# Patient Record
Sex: FEMALE | Race: BLACK OR AFRICAN AMERICAN | NOT HISPANIC OR LATINO | Employment: FULL TIME | URBAN - METROPOLITAN AREA
[De-identification: names, ages, dates, MRNs, and addresses within clinical notes are randomized per-mention and may not be internally consistent; named-entity substitution may affect disease eponyms.]

---

## 2017-01-11 ENCOUNTER — PRENATAL OFFICE VISIT (OUTPATIENT)
Dept: OBGYN | Facility: CLINIC | Age: 22
End: 2017-01-11
Payer: COMMERCIAL

## 2017-01-11 VITALS
WEIGHT: 195 LBS | DIASTOLIC BLOOD PRESSURE: 72 MMHG | BODY MASS INDEX: 31.34 KG/M2 | SYSTOLIC BLOOD PRESSURE: 104 MMHG | HEIGHT: 66 IN

## 2017-01-11 DIAGNOSIS — J45.20 MILD INTERMITTENT ASTHMA WITHOUT COMPLICATION: ICD-10-CM

## 2017-01-11 DIAGNOSIS — K59.09 CHRONIC CONSTIPATION: ICD-10-CM

## 2017-01-11 DIAGNOSIS — Z34.02 ENCOUNTER FOR SUPERVISION OF NORMAL FIRST PREGNANCY IN SECOND TRIMESTER: Primary | ICD-10-CM

## 2017-01-11 LAB
ABO + RH BLD: NORMAL
ABO + RH BLD: NORMAL
BLD GP AB SCN SERPL QL: NORMAL
BLOOD BANK CMNT PATIENT-IMP: NORMAL
ERYTHROCYTE [DISTWIDTH] IN BLOOD BY AUTOMATED COUNT: 13.3 % (ref 10–15)
HCT VFR BLD AUTO: 37.4 % (ref 35–47)
HGB BLD-MCNC: 12.1 G/DL (ref 11.7–15.7)
MCH RBC QN AUTO: 26.2 PG (ref 26.5–33)
MCHC RBC AUTO-ENTMCNC: 32.4 G/DL (ref 31.5–36.5)
MCV RBC AUTO: 81 FL (ref 78–100)
PLATELET # BLD AUTO: 300 10E9/L (ref 150–450)
RBC # BLD AUTO: 4.61 10E12/L (ref 3.8–5.2)
SPECIMEN EXP DATE BLD: NORMAL
WBC # BLD AUTO: 9.4 10E9/L (ref 4–11)

## 2017-01-11 PROCEDURE — 85027 COMPLETE CBC AUTOMATED: CPT | Performed by: ADVANCED PRACTICE MIDWIFE

## 2017-01-11 PROCEDURE — 87389 HIV-1 AG W/HIV-1&-2 AB AG IA: CPT | Performed by: ADVANCED PRACTICE MIDWIFE

## 2017-01-11 PROCEDURE — 83021 HEMOGLOBIN CHROMOTOGRAPHY: CPT | Mod: 90 | Performed by: ADVANCED PRACTICE MIDWIFE

## 2017-01-11 PROCEDURE — 87491 CHLMYD TRACH DNA AMP PROBE: CPT | Performed by: ADVANCED PRACTICE MIDWIFE

## 2017-01-11 PROCEDURE — 87591 N.GONORRHOEAE DNA AMP PROB: CPT | Performed by: ADVANCED PRACTICE MIDWIFE

## 2017-01-11 PROCEDURE — 86900 BLOOD TYPING SEROLOGIC ABO: CPT | Performed by: ADVANCED PRACTICE MIDWIFE

## 2017-01-11 PROCEDURE — 99207 ZZC FIRST OB VISIT: CPT | Performed by: ADVANCED PRACTICE MIDWIFE

## 2017-01-11 PROCEDURE — 87340 HEPATITIS B SURFACE AG IA: CPT | Performed by: ADVANCED PRACTICE MIDWIFE

## 2017-01-11 PROCEDURE — 87086 URINE CULTURE/COLONY COUNT: CPT | Performed by: ADVANCED PRACTICE MIDWIFE

## 2017-01-11 PROCEDURE — 86780 TREPONEMA PALLIDUM: CPT | Performed by: ADVANCED PRACTICE MIDWIFE

## 2017-01-11 PROCEDURE — 86901 BLOOD TYPING SEROLOGIC RH(D): CPT | Performed by: ADVANCED PRACTICE MIDWIFE

## 2017-01-11 PROCEDURE — 81511 FTL CGEN ABNOR FOUR ANAL: CPT | Mod: 90 | Performed by: ADVANCED PRACTICE MIDWIFE

## 2017-01-11 PROCEDURE — 99000 SPECIMEN HANDLING OFFICE-LAB: CPT | Performed by: ADVANCED PRACTICE MIDWIFE

## 2017-01-11 PROCEDURE — 36415 COLL VENOUS BLD VENIPUNCTURE: CPT | Performed by: ADVANCED PRACTICE MIDWIFE

## 2017-01-11 PROCEDURE — 86850 RBC ANTIBODY SCREEN: CPT | Performed by: ADVANCED PRACTICE MIDWIFE

## 2017-01-11 PROCEDURE — 86762 RUBELLA ANTIBODY: CPT | Performed by: ADVANCED PRACTICE MIDWIFE

## 2017-01-11 RX ORDER — PRENATAL VIT/IRON FUM/FOLIC AC 27MG-0.8MG
1 TABLET ORAL DAILY
Qty: 100 TABLET | Refills: 3 | COMMUNITY
Start: 2017-01-11

## 2017-01-11 RX ORDER — ASPIRIN 81 MG
100 TABLET, DELAYED RELEASE (ENTERIC COATED) ORAL DAILY
Qty: 60 TABLET | Refills: 3 | Status: SHIPPED | OUTPATIENT
Start: 2017-01-11 | End: 2017-06-07

## 2017-01-11 RX ORDER — ALBUTEROL SULFATE 90 UG/1
2 AEROSOL, METERED RESPIRATORY (INHALATION) EVERY 6 HOURS PRN
Qty: 3 INHALER | Refills: 1 | COMMUNITY
Start: 2017-01-11

## 2017-01-11 NOTE — PROGRESS NOTES
15w0d  NOB here at [facility].   Works as  in .  FOB not involved, pt family very supportive.  Reviewed CNM service, warning signs. Strongly encouraged 10-15 lb weight gain only as family hx of diabetes and hypertension.  Reviewed carbohydrate portion sizes, enc. 6 fruits/vegs daily and 8 glasses of water.  Pt has hx of constipation, discussed constipation remedies dietary but will prescribe stool softeners to use every other day.  rtc in 4 weeks with U/S for fetal survey. luis alberto Gunter is a  single   1 para 0 0 0 0  with a LMP of of 9/10/16 giving an EDC by ultrasound of 19 who presents for a new OB Visit.  The first positive pregnancy test was obtained on 16.  Conception date is unknown.  She has not had bleeding since her LMP.  She has had mild nausea.. Weigh loss   has not occurred.. She denies fever, chills and viral infections since her last LMP.  She stopped taking  (medications) when she suspected pregnancy.  There is mildfatigue.  This was not a planned pregnancy.  She is not a previous CNM patient.  FOB is not involved.  =========================================    INFECTION HISTORY  HIV: no  Hepatitis B: no  Hepatitis C: no  Tuberculosis: no  Last PPD   Herpes self: no  Herpes partner:  no  Chlamydia:  no  Gonorrhea:  no  HPV: no  BV:  no  Trichomonis:  no  Syphilis:  no  Chicken Pox:  no  ============================================    PERSONAL/SOCIAL HISTORY  Lives lives with their family.  Employment: Full time.  Her job involves moderate activity and long periods of standing with little potential for toxic exposure.  Additional items: None  =============================================    REVIEW OF SYSTEMS  CONSTITUTIONAL: Denies fever, chills and night sweats  DIET: Appetite is decrease.  Eats a regular.  Caffeine intake daily is 1-2.    Plans to gain 10-15 pounds with her pregnancy.  SKIN: Denies changes and suspicious moles or lesions.  ENT:  Denies blurred vision, hearing loss, tinnitus, frequent colds, epistaxis, hoarseness and tooth pain.    ENDOCRINE: Denies heat and cold intolerance, and polydypsia.    BREASTS:  Intermittent tenderness since LMP.  Denies discharge and lumps.   HEART/LUNGS: Denies dyspnea, wheezing, coughing and chest pain.  HEMATOLOGIC: Denies tendency to bruise and history of blood clots.  GASTROINTESTINAL: Denies heartburn, indigestion and change of color in stools.    GENITOURINARY:  Denies urgency, dysuria and hematuria.  Has frequency of urination since LMP.   NEUROLOGIC:  Denies seizures, weakness and fainting.  PSYCHIATRIC:  Denies depression or anxiety  ===========================================    PHYSICAL EXAM  GENERAL:   pleasant pregnant female, alert, cooperative  and well groomed.  SKIN:  Warm and dry, without lesions or tenderness.    HEAD: Symmetrical features.  EYES:  PERRLA, wears glasses.  EARS: Tympanic membranes gray, translucent and intact.  NOSE: No flaring, patent  MOUTH:  Buccal mucosa pink, moist without lesions.  Teeth in good repair.    NECK:  Thyroid without enlargement and nodules.  Lymph nodes not palpable.   LUNGS:  Clear to auscultation.  BREAST:  Symmetrical without lesions or nodes.  Nipples everted.  Areolas symmetric.  No palpable axillary nodes.  HEART:  RRR without murmur.  ABDOMEN: Soft without masses or tenderness.  No CVA tenderness.  Uterus palpable at size equal to dates.    .Well healed scar from appendectomy.  MUSCULOSKELETAL:  Full range of motion  GENITALIA: Secondary genital hair growth pattern is in a normal female distribution.  Bartholin and Blanche glands without tenderness and inflammation.  Perineum without lesions.  VAGINA:  Pink, normal ruga and discharge.  CERVIX:  Anterior, smooth, without discharge or CMT and nulliparous os, firm/ closed 4 cm long.  UTERUS: Anteverted, nontender 15 weeks in size.  ADNEXA: Without masses or tenderness  RECTAL:  Normal appearance.  Digital  exam deferred.  PELVIS:  Arch; wide . Sacrum; deep. Spines;blunt.  Side walls; straight. Type; gynecoid  Pelvis not proven  EXTREMITIES:  2+/2+ DTR, No edema. No significant varicosities.  UA;  Negative- glucose, bilirubin, ketones, blood, protein, nitrites, leukocytes, normal urobili, pH7.0, SG; 1.010  ===================================================    PLAN:  Instructed on use of triage nurse line and contacting the on call CNM after hours in an emergency.    Discussed the indications, uses for and false positives for quad screen and fetal survey ultrasounds at 18-20 weeks gestation.  Will inform us at the next visit if she wished to avail herself of these screens.  Will return to the clinic in 4 weeks for her next routine prenatal check.  Will call to be seen sooner if problem arise.  Discussed the risks, benefits, side effects and alternative therapies for current prescribed and OTC medications.

## 2017-01-11 NOTE — Clinical Note
[address] [id number] [phone]        January 18, 2017      Julieth Connelly  [address]   [facility] [address]          Dear Ms. Connelly,    The results of your recent lab tests were within normal limits. Your QUAD screen was also normal. Enclosed is a copy of these results.  If you have any further questions or problems, please contact our office at [phone].  Component      Latest Ref Rng 1/11/2017          10:00 AM   Hemoglobin A1          Hemoglobin A2          Hemoglobin F          Hemoglobin S Eval          Hemoglobin C          Hemoglobin E          Hemoglobin Other          HGB Abn Evaluation          Sickle Cell Solubility Confirm          Hemoglobin Capillary ELP          WBC      4.0 - 11.0 10e9/L    RBC Count      3.8 - 5.2 10e12/L    Hemoglobin      11.7 - 15.7 g/dL    Hematocrit      35.0 - 47.0 %    MCV      78 - 100 fl    MCH      26.5 - 33.0 pg    MCHC      31.5 - 36.5 g/dL    RDW      10.0 - 15.0 %    Platelet Count      150 - 450 10e9/L    ABO          RH(D)          Antibody Screen          Test Valid Only At          Specimen Expires          Specimen Description       Midstream Urine   Special Requests       Specimen received in preservative   Culture Micro       No growth   Micro Report Status       FINAL 01/12/2017   Specimen Descrip          N Gonorrhea PCR      NEG    Chlamydia Trachomatis PCR      NEG    HIV Antigen Antibody Combo      NR    Rubella Antibody IgG Quantitative          Hep B Surface Agn      NR    Treponema pallidum Antibody      NEG      Component      Latest Ref Rng 1/11/2017          10:30 AM   Hemoglobin A1       97.0   Hemoglobin A2       2.8   Hemoglobin F       0.2   Hemoglobin S Eval       0.0   Hemoglobin C       0.0   Hemoglobin E       0.0   Hemoglobin Other       0.0   HGB Abn Evaluation       SEE NOTE . . .   Sickle Cell Solubility Confirm       Not Performed   Hemoglobin  Capillary ELP       Not Performed . . .   WBC      4.0 - 11.0 10e9/L 9.4   RBC Count      3.8 - 5.2 10e12/L 4.61   Hemoglobin      11.7 - 15.7 g/dL 12.1   Hematocrit      35.0 - 47.0 % 37.4   MCV      78 - 100 fl 81   MCH      26.5 - 33.0 pg 26.2 (L)   MCHC      31.5 - 36.5 g/dL 32.4   RDW      10.0 - 15.0 % 13.3   Platelet Count      150 - 450 10e9/L 300   ABO       O   RH(D)       Pos   Antibody Screen       Neg   Test Valid Only At       [facility]   Specimen Expires       01/14/2017   Specimen Description       Cervix   Special Requests          Culture Micro          Micro Report Status          Specimen Descrip       Cervix   N Gonorrhea PCR      NEG Negative . . .   Chlamydia Trachomatis PCR      NEG Negative . . .   HIV Antigen Antibody Combo      NR Nonreactive . . .   Rubella Antibody IgG Quantitative       18   Hep B Surface Agn      NR Nonreactive   Treponema pallidum Antibody      NEG Negative     Sincerely,        Bernie Dickerson CNM/ty

## 2017-01-12 LAB
BACTERIA SPEC CULT: NO GROWTH
C TRACH DNA SPEC QL NAA+PROBE: NORMAL
HBV SURFACE AG SERPL QL IA: NONREACTIVE
HIV 1+2 AB+HIV1 P24 AG SERPL QL IA: NORMAL
Lab: NORMAL
MICRO REPORT STATUS: NORMAL
N GONORRHOEA DNA SPEC QL NAA+PROBE: NORMAL
RUBV IGG SERPL IA-ACNC: 18 IU/ML
SPECIMEN SOURCE: NORMAL
T PALLIDUM IGG+IGM SER QL: NEGATIVE

## 2017-01-12 ASSESSMENT — PATIENT HEALTH QUESTIONNAIRE - PHQ9: SUM OF ALL RESPONSES TO PHQ QUESTIONS 1-9: 8

## 2017-01-13 LAB
# FETUSES US: NORMAL
AFP ADJ MOM AMN: 1
AFP SERPL-MCNC: 34 NG/ML
AGE - REPORTED: 20.2
DATING METHOD: NORMAL
DIABETIC AT CONCEPTION: NO
FAMILY MEMBER DISEASES HX: NO
FAMILY MEMBER DISEASES HX: NO
GA METHOD: NORMAL
GA: 16.86 WK
HCG MOM SERPL: 1.87
HCG SERPL-ACNC: NORMAL M[IU]/ML
HGB A1 MFR BLD: 97 %
HGB A2 MFR BLD: 2.8 %
HGB C MFR BLD: 0 %
HGB E MFR BLD: 0 %
HGB F MFR BLD: 0.2 %
HGB FRACT BLD ELPH-IMP: NORMAL
HGB OTHER MFR BLD: 0 %
HGB S BLD QL SOLY: NORMAL
HGB S MFR BLD: 0 %
HX OF HEREDITARY DISORDERS: NO
IDDM PATIENT QL: NO
INHIBIN A MOM SERPL: 1.32
INHIBIN A SERPL-MCNC: 175
INTEGRATED SCN PATIENT-IMP: NORMAL
LMP START DATE: NORMAL
PATH INTERP BLD-IMP: NORMAL
PATHOLOGY STUDY: NORMAL
PREV HX CHROMOSOME ABNORMALITY: NO
SPECIMEN DRAWN SERPL: NORMAL
TWINS: NO
U ESTRIOL MOM SERPL: 0.58
U ESTRIOL SERPL-MCNC: 0.59 NG/ML

## 2017-01-28 ENCOUNTER — TELEPHONE (OUTPATIENT)
Dept: OBGYN | Facility: CLINIC | Age: 22
End: 2017-01-28

## 2017-01-28 ENCOUNTER — TELEPHONE (OUTPATIENT)
Dept: NURSING | Facility: CLINIC | Age: 22
End: 2017-01-28

## 2017-01-29 NOTE — TELEPHONE ENCOUNTER
Call Type: Triage Call    Presenting Problem: 17 wks pregnant chills cough headache tylenol  taken didn't help haven't been eating dont have appetite feels super  full when tries. symptoms started this am.  no thermometer.  [phone]. Paged oncall midwife to pt at [phone] via smart web.  Triage Note:  Guideline Title: Upper Respiratory Infection (URI) ; Upper Respiratory  Infection (URI)  Recommended Disposition: Provide Home/Self Care  Original Inclination: Wanted to speak with a nurse  Override Disposition: Call Provider Immediately  Intended Action: See Dr/Make Appt  Physician Contacted: No  All other situations ?  YES  Hoarseness (laryngitis) ? NO  Ear congestion, pressure, fullness ? NO  Severe breathing problems ? NO  Breathing symptoms (wheezing, shortness of breath, changes in rates of breathing,  skin color changes) main problem ? NO  Sore throat is the symptom causing most concern ? NO  Cough is the symptom causing most concern ? NO  New onset of eye redness, irritation/foreign body sensation or gritty feeling with  yellow/green drainage ? NO  Temperature of 101.5 F (38.6 C) or greater that has not responded to 24 hours of  home care measures ? NO  Localized tender, swollen glands (lymph nodes) that persist or are unimproved  after 14 days ? NO  Symptoms worsen after 7 days or symptoms do not improve after 14 days of home care  ? NO  Recent or recurrent episodes of sneezing, nasal congestion; watery nasal drainage;  scratchy/itchy throat; red/itchy/watery eyes or cough unrelieved after one week  of home care measures ? NO  Dry cough following a cold ? NO  Sudden onset of flu-like symptoms ? NO  Temperature up to 101.5 F (38.6C) and has not started any home care OR home care  for less than 24 hours ? NO  Pregnant and has a temperature up to 100 F (37.7 C) that has not responded to 3  days of home care ? Asked but Not Answered:  New onset of the following symptoms: nasal congestion;  runny nose;  sneezing;  itchy or mild sore throat. May also have cough; irritated eyes or a mild headache  or a low grade fever up to 101.5 F (38.6C). ? NO  Being treated by a provider for a secondary infection AND no improvement in  symptoms, symptoms have worsened OR has new symptoms after following treatment  plan for the time specified by provider. ? NO  Previous call within last week for similar symptoms AND has followed recommended  self care measures for a week but symptoms have not improved or symptoms have  worsened. ? NO  Has called within last week and has questions/concerns about symptoms, recommended  treatment or self care. ? NO  Mild to moderate headache for more than 24 hours unrelieved with 8 hours of  nonprescription medications ? NO  Generalized mild frontal headache unresponsive to 24 hours of home care measures ?  NO  Severe headache unrelieved by 8 hours of nonprescription medication ? NO  New productive cough with thick colored sputum (other than clear or white sputum;  not postnasal drainage) ? NO  Pregnant and has temperature 100F (37.7 C) or greater ? Asked but Not Answered:  Any temperature elevation in an immunocompromised individual OR frail elderly with  signs of dehydration ? NO  Pressure/pain above or below eyes, near ears over sinuses (may also be described  as fullness, worsens when bending forward, teeth or eye pain) ? NO  Physician Instructions:  Care Advice: SYMPTOM / CONDITION MANAGEMENT

## 2017-01-29 NOTE — TELEPHONE ENCOUNTER
Called feeling miserable with cold symptoms.  Sounds very congested on the phone.  Says she has a cough and headache.  She thinks her mom has strep throat.  I advised fluids and rest.  She can take Tylenol and plain cough medicine and sudafed or benadryl.  She can go to Urgent Care to tomorrow to get checked for strep.  She verbalized agreement and understand of plan.

## 2017-02-09 ENCOUNTER — RADIANT APPOINTMENT (OUTPATIENT)
Dept: ULTRASOUND IMAGING | Facility: CLINIC | Age: 22
End: 2017-02-09
Attending: ADVANCED PRACTICE MIDWIFE
Payer: COMMERCIAL

## 2017-02-09 ENCOUNTER — PRENATAL OFFICE VISIT (OUTPATIENT)
Dept: MIDWIFE SERVICES | Facility: CLINIC | Age: 22
End: 2017-02-09
Attending: ADVANCED PRACTICE MIDWIFE
Payer: COMMERCIAL

## 2017-02-09 VITALS
BODY MASS INDEX: 33.58 KG/M2 | WEIGHT: 205 LBS | SYSTOLIC BLOOD PRESSURE: 105 MMHG | HEART RATE: 86 BPM | DIASTOLIC BLOOD PRESSURE: 69 MMHG | OXYGEN SATURATION: 100 %

## 2017-02-09 DIAGNOSIS — Z34.02 ENCOUNTER FOR SUPERVISION OF NORMAL FIRST PREGNANCY IN SECOND TRIMESTER: ICD-10-CM

## 2017-02-09 DIAGNOSIS — Z34.02 ENCOUNTER FOR SUPERVISION OF NORMAL FIRST PREGNANCY IN SECOND TRIMESTER: Primary | ICD-10-CM

## 2017-02-09 PROCEDURE — 76805 OB US >/= 14 WKS SNGL FETUS: CPT | Performed by: OBSTETRICS & GYNECOLOGY

## 2017-02-09 PROCEDURE — 99207 ZZC PRENATAL VISIT: CPT | Performed by: ADVANCED PRACTICE MIDWIFE

## 2017-02-09 NOTE — PROGRESS NOTES
19w1d  Fetal survey ultrasound today. Prelim results are are within normal limits. Has a gender reveal party on Saturday. Reports good fetal movement. Denies leaking of fluid, vaginal bleeding, regular uterine contractions, headache or other concerns.  Discussed upcoming GCT but has appointment scheduled for <24 wks. Will need to schedule another sometime between 24-26 wks. [facility]

## 2017-02-20 ENCOUNTER — PRENATAL OFFICE VISIT (OUTPATIENT)
Dept: MIDWIFE SERVICES | Facility: CLINIC | Age: 22
End: 2017-02-20
Payer: COMMERCIAL

## 2017-02-20 VITALS
TEMPERATURE: 97.8 F | HEART RATE: 94 BPM | WEIGHT: 209 LBS | DIASTOLIC BLOOD PRESSURE: 69 MMHG | BODY MASS INDEX: 34.25 KG/M2 | OXYGEN SATURATION: 100 % | SYSTOLIC BLOOD PRESSURE: 113 MMHG

## 2017-02-20 DIAGNOSIS — N76.0 BV (BACTERIAL VAGINOSIS): ICD-10-CM

## 2017-02-20 DIAGNOSIS — R30.0 DYSURIA: Primary | ICD-10-CM

## 2017-02-20 DIAGNOSIS — B96.89 BV (BACTERIAL VAGINOSIS): ICD-10-CM

## 2017-02-20 DIAGNOSIS — N89.8 VAGINAL ITCHING: ICD-10-CM

## 2017-02-20 DIAGNOSIS — R82.90 NONSPECIFIC FINDING ON EXAMINATION OF URINE: ICD-10-CM

## 2017-02-20 LAB
ALBUMIN UR-MCNC: NEGATIVE MG/DL
APPEARANCE UR: CLEAR
BACTERIA #/AREA URNS HPF: ABNORMAL /HPF
BILIRUB UR QL STRIP: NEGATIVE
COLOR UR AUTO: YELLOW
GLUCOSE UR STRIP-MCNC: NEGATIVE MG/DL
HGB UR QL STRIP: NEGATIVE
KETONES UR STRIP-MCNC: NEGATIVE MG/DL
LEUKOCYTE ESTERASE UR QL STRIP: ABNORMAL
MICRO REPORT STATUS: ABNORMAL
NITRATE UR QL: NEGATIVE
NON-SQ EPI CELLS #/AREA URNS LPF: ABNORMAL /LPF
PH UR STRIP: 6 PH (ref 5–7)
RBC #/AREA URNS AUTO: ABNORMAL /HPF (ref 0–2)
SP GR UR STRIP: 1.02 (ref 1–1.03)
SPECIMEN SOURCE: ABNORMAL
URN SPEC COLLECT METH UR: ABNORMAL
UROBILINOGEN UR STRIP-ACNC: 1 EU/DL (ref 0.2–1)
WBC #/AREA URNS AUTO: ABNORMAL /HPF (ref 0–2)
WET PREP SPEC: ABNORMAL

## 2017-02-20 PROCEDURE — 87210 SMEAR WET MOUNT SALINE/INK: CPT | Performed by: ADVANCED PRACTICE MIDWIFE

## 2017-02-20 PROCEDURE — 99207 ZZC PRENATAL VISIT: CPT | Performed by: ADVANCED PRACTICE MIDWIFE

## 2017-02-20 PROCEDURE — 81001 URINALYSIS AUTO W/SCOPE: CPT | Performed by: ADVANCED PRACTICE MIDWIFE

## 2017-02-20 PROCEDURE — 87086 URINE CULTURE/COLONY COUNT: CPT | Performed by: ADVANCED PRACTICE MIDWIFE

## 2017-02-20 RX ORDER — TERCONAZOLE 0.4 %
1 CREAM WITH APPLICATOR VAGINAL AT BEDTIME
Qty: 45 G | Refills: 0 | Status: SHIPPED | OUTPATIENT
Start: 2017-02-20 | End: 2017-02-27

## 2017-02-20 RX ORDER — METRONIDAZOLE 500 MG/1
500 TABLET ORAL 2 TIMES DAILY
Qty: 14 TABLET | Refills: 0 | Status: SHIPPED | OUTPATIENT
Start: 2017-02-20 | End: 2017-03-08

## 2017-02-20 NOTE — MR AVS SNAPSHOT
After Visit Summary   2/20/2017    Julieth Connelly    MRN: [id number]           Patient Information     Date Of Birth          1995        Visit Information        Provider Department      2/20/2017 2:45 PM Rigo Beasley APRN CNM [facility]        Today's Diagnoses     Dysuria    -  1    Vaginal itching        BV (bacterial vaginosis)        Nonspecific finding on examination of urine           Follow-ups after your visit        Your next 10 appointments already scheduled     Mar 16, 2017  9:00 AM CDT   LAB with RD LAB   [facility] ([facility])    [address] [place] [id number] [phone]           Patient must bring picture ID.  Patient should be prepared to give a urine specimen  Please do not eat 10-12 hours before your appointment if you are coming in fasting for labs on lipids, cholesterol, or glucose (sugar).  Pregnant women should follow their Care Team instructions. Water with medications is okay. Do not drink coffee or other fluids.   If you have concerns about taking  your medications, please ask at office or if scheduling via [facility], send a message by clicking on Secure Messaging, Message Your Care Team.            Mar 16, 2017  9:30 AM CDT   ESTABLISHED PRENATAL with MURALI Elizondo CNM   [facility] [place] [facility] ([facility] [place] [facility]    [address] [place] [id number] [phone]              Who to contact     If you have questions or need follow up information about today's clinic visit or your schedule please contact [facility] directly at [phone].  Normal or non-critical lab and imaging results will be communicated to you by MyChart, letter or phone within 4 business days after the clinic has received the results. If you do not hear from us within 7 days, please contact the clinic through MyChart or phone. If you have a critical or abnormal  "lab result, we will notify you by phone as soon as possible.  Submit refill requests through [employer] or call your pharmacy and they will forward the refill request to us. Please allow 3 business days for your refill to be completed.          Additional Information About Your Visit        [facility] Information     [employer] lets you send messages to your doctor, view your test results, renew your prescriptions, schedule appointments and more. To sign up, go to [email or web address] . Click on \"Log in\" on the left side of the screen, which will take you to the Welcome page. Then click on \"Sign up Now\" on the right side of the page.     You will be asked to enter the access code listed below, as well as some personal information. Please follow the directions to create your username and password.     Your access code is: [email or web address]  Expires: 2017 11:03 AM     Your access code will  in 90 days. If you need help or a new code, please call your [place] clinic or [phone].        Care EveryWhere ID     This is your Care EveryWhere ID. This could be used by other organizations to access your [place] medical records  [id number]        Your Vitals Were     Pulse Temperature Pulse Oximetry BMI (Body Mass Index)          94 97.8  F (36.6  C) (Oral) 100% 34.25 kg/m2         Blood Pressure from Last 3 Encounters:   17 113/69   17 105/69   17 104/72    Weight from Last 3 Encounters:   17 209 lb (94.8 kg) (98 %)*   17 205 lb (93 kg) (98 %)*   17 195 lb (88.5 kg) (97 %)*     * Growth percentiles are based on CDC 2-20 Years data.              We Performed the Following     UA reflex to Microscopic and Culture     Urine Culture Aerobic Bacterial     Urine Microscopic     Wet prep          Today's Medication Changes          These changes are accurate as of: 17 11:59 PM.  If you have any questions, ask your nurse or doctor.               Start taking these medicines.        " Dose/Directions    metroNIDAZOLE 500 MG tablet   Commonly known as:  FLAGYL   Used for:  BV (bacterial vaginosis)   Started by:  Rigo Beasley APRN CNM        Dose:  500 mg   Take 1 tablet (500 mg) by mouth 2 times daily   Quantity:  14 tablet   Refills:  0       terconazole 0.4 % cream   Commonly known as:  TERAZOL 7   Used for:  Vaginal itching   Started by:  Rigo Beasley APRN CNARTUR        Dose:  1 applicator   Place 1 applicator vaginally At Bedtime for 7 days   Quantity:  45 g   Refills:  0            Where to get your medicines      These medications were sent to [facility]/pharmacy #[id number] - [facility], [place] - [address]  [address], [facility] [address]     Phone:  [phone]     metroNIDAZOLE 500 MG tablet    terconazole 0.4 % cream                Primary Care Provider    None Specified       No primary provider on file.        Thank you!     Thank you for choosing [facility]  for your care. Our goal is always to provide you with excellent care. Hearing back from our patients is one way we can continue to improve our services. Please take a few minutes to complete the written survey that you may receive in the mail after your visit with us. Thank you!             Your Updated Medication List - Protect others around you: Learn how to safely use, store and throw away your medicines at www.disposemymeds.org.          This list is accurate as of: 2/20/17 11:59 PM.  Always use your most recent med list.                   Brand Name Dispense Instructions for use    albuterol 108 (90 BASE) MCG/ACT Inhaler    PROAIR HFA/PROVENTIL HFA/VENTOLIN HFA    3 Inhaler    Inhale 2 puffs into the lungs every 6 hours as needed for shortness of breath / dyspnea or wheezing       docusate sodium 100 MG tablet    COLACE    60 tablet    Take 100 mg by mouth daily       metroNIDAZOLE 500 MG tablet    FLAGYL    14 tablet    Take 1 tablet (500 mg) by mouth 2 times daily       prenatal multivitamin  plus iron  27-0.8 MG Tabs per tablet     100 tablet    Take 1 tablet by mouth daily       terconazole 0.4 % cream    TERAZOL 7    45 g    Place 1 applicator vaginally At Bedtime for 7 days

## 2017-02-20 NOTE — PROGRESS NOTES
SUBJECTIVE:   21 year old female complains of vaginal irritation for past 2 weeks.  States that she has urinary frequency, and irritation when she wipes.  Noted that there was scant amount of blood on tissue last week.  Reports has had white vaginal discharge since the beginning of pregnancy, has not noted change in discharge color or odor.    Denies abnormal vaginal bleeding or significant pelvic pain or  fever.  Denies history of known exposure to STD. Has not had sexual intercourse since NOB visit, GC/Chlymidia was negative at this time. Denies leaking of fluid or uterine contractions.      Patient's LMP from OB Dating Form was 9/10/2016.    OBJECTIVE:   She appears well, afebrile.  Abdomen: benign, soft, nontender, no masses.  Pelvic Exam: normal vagina and vulva.  UA: negative for all components.    ASSESSMENT:   monilia vaginitis and bacteral vaginosis  Results for orders placed or performed in visit on 02/20/17 (from the past 24 hour(s))   UA reflex to Microscopic and Culture   Result Value Ref Range    Color Urine Yellow     Appearance Urine Clear     Glucose Urine Negative NEG mg/dL    Bilirubin Urine Negative NEG    Ketones Urine Negative NEG mg/dL    Specific Gravity Urine 1.020 1.003 - 1.035    Blood Urine Negative NEG    pH Urine 6.0 5.0 - 7.0 pH    Protein Albumin Urine Negative NEG mg/dL    Urobilinogen Urine 1.0 0.2 - 1.0 EU/dL    Nitrite Urine Negative NEG    Leukocyte Esterase Urine Moderate (A) NEG    Source Midstream Urine    Urine Microscopic   Result Value Ref Range    WBC Urine 2-5 (A) 0 - 2 /HPF    RBC Urine O - 2 0 - 2 /HPF    Squamous Epithelial /LPF Urine Moderate (A) FEW /LPF    Bacteria Urine Moderate (A) NEG /HPF   Wet prep   Result Value Ref Range    Specimen Description Vagina     Wet Prep Clue cells seen  Yeast seen  No Trichomonas seen   (A)     Micro Report Status FINAL 02/20/2017          PLAN:   GC and chlamydia genprobe swabs sent to lab.  Treatment: Terconazole cream and Flagyl  500 BID x 7 days  RTC prn if symptoms persist or worsen. Continue scheduled prenatal care.     Concur with SNM assessment and plan.  Physical exam supervised and without excoriation at introitus but no evidence of UTI.   Rigo Beasley APRN, CNM

## 2017-02-21 LAB
BACTERIA SPEC CULT: NORMAL
MICRO REPORT STATUS: NORMAL
SPECIMEN SOURCE: NORMAL

## 2017-02-24 ENCOUNTER — TELEPHONE (OUTPATIENT)
Dept: NURSING | Facility: CLINIC | Age: 22
End: 2017-02-24

## 2017-02-25 NOTE — TELEPHONE ENCOUNTER
Call Type: Triage Call    Presenting Problem: horrible tooth pain.  what can I take.  tylenol.  I am boosting the dosage. ER on Tuesday they put a dental block in  have followed up but they don't do anything they just recommend I go  elsewhere.  It has been bad all day.  What can I take for the pain.  recommended tylenol if not helping pt should see in ER again.  Triage Note:  Guideline Title: Teeth and Jaw Symptoms ; Teeth and Jaw Symptoms  Recommended Disposition: Call Dentist Immediately  Original Inclination: Wanted to speak with a nurse  Override Disposition: See ED Immediately  Intended Action: Follow Selfcare / Homecare  Physician Contacted: No  New onset of unbearable pain within last 24 hours ?  YES  Rapid onset of generalized swelling of face or neck (other than glands or lymph  nodes) ? NO  Any injury limited to tooth, teeth, mouth or gums ? NO  Unable to open or close mouth fully ? NO  Any other cardiac signs/symptoms for more than 5 minutes, now or within last hour.  Pain is NOT associated with taking a deep breath or a productive cough, movement,  or touch to a localized area on the chest or upper body. ? NO  Any temperature elevation in an immunocompromised individual OR frail elderly with  signs of dehydration ? NO  Physician Instructions:  Care Advice: SYMPTOM / CONDITION MANAGEMENT

## 2017-03-08 ENCOUNTER — PRENATAL OFFICE VISIT (OUTPATIENT)
Dept: OBGYN | Facility: CLINIC | Age: 22
End: 2017-03-08
Payer: COMMERCIAL

## 2017-03-08 VITALS — BODY MASS INDEX: 34.41 KG/M2 | DIASTOLIC BLOOD PRESSURE: 80 MMHG | SYSTOLIC BLOOD PRESSURE: 132 MMHG | WEIGHT: 210 LBS

## 2017-03-08 DIAGNOSIS — Z34.02 ENCOUNTER FOR SUPERVISION OF NORMAL FIRST PREGNANCY IN SECOND TRIMESTER: Primary | ICD-10-CM

## 2017-03-08 PROCEDURE — 99207 ZZC PRENATAL VISIT: CPT | Performed by: ADVANCED PRACTICE MIDWIFE

## 2017-03-08 NOTE — MR AVS SNAPSHOT
After Visit Summary   3/8/2017    Julieth Connelly    MRN: [id number]           Patient Information     Date Of Birth          1995        Visit Information        Provider Department      3/8/2017 9:15 AM Rigo Beasley APRN CNM [facility]        Today's Diagnoses     Encounter for supervision of normal first pregnancy in second trimester    -  1       Follow-ups after your visit        Your next 10 appointments already scheduled     Mar 16, 2017  9:00 AM CDT   LAB with RD LAB   [facility] ([facility])    [address] [place] [id number] [phone]           Patient must bring picture ID.  Patient should be prepared to give a urine specimen  Please do not eat 10-12 hours before your appointment if you are coming in fasting for labs on lipids, cholesterol, or glucose (sugar).  Pregnant women should follow their Care Team instructions. Water with medications is okay. Do not drink coffee or other fluids.   If you have concerns about taking  your medications, please ask at office or if scheduling via Le[employer], send a message by clicking on Secure Messaging, Message Your Care Team.            Mar 16, 2017  9:30 AM CDT   ESTABLISHED PRENATAL with MURALI Elizondo CNM   [facility] ([facility])    [address] [place] [id number] [phone]              Who to contact     If you have questions or need follow up information about today's clinic visit or your schedule please contact [facility] directly at [phone].  Normal or non-critical lab and imaging results will be communicated to you by MyChart, letter or phone within 4 business days after the clinic has received the results. If you do not hear from us within 7 days, please contact the clinic through MyChart or phone. If you have a critical or abnormal lab result, we will notify you by phone as  "soon as possible.  Submit refill requests through [employer] or call your pharmacy and they will forward the refill request to us. Please allow 3 business days for your refill to be completed.          Additional Information About Your Visit        YapStoneharFashiontrot Information     [employer] lets you send messages to your doctor, view your test results, renew your prescriptions, schedule appointments and more. To sign up, go to [email or web address] . Click on \"Log in\" on the left side of the screen, which will take you to the Welcome page. Then click on \"Sign up Now\" on the right side of the page.     You will be asked to enter the access code listed below, as well as some personal information. Please follow the directions to create your username and password.     Your access code is: [email or web address]  Expires: 2017 11:03 AM     Your access code will  in 90 days. If you need help or a new code, please call your [place] clinic or [phone].        Care EveryWhere ID     This is your Care EveryWhere ID. This could be used by other organizations to access your [place] medical records  [id number]        Your Vitals Were     BMI (Body Mass Index)                   34.41 kg/m2            Blood Pressure from Last 3 Encounters:   17 132/80   17 113/69   17 105/69    Weight from Last 3 Encounters:   17 210 lb (95.3 kg) (98 %)*   17 209 lb (94.8 kg) (98 %)*   17 205 lb (93 kg) (98 %)*     * Growth percentiles are based on [facility] 2-20 Years data.              Today, you had the following     No orders found for display         Today's Medication Changes          These changes are accurate as of: 3/8/17  9:34 AM.  If you have any questions, ask your nurse or doctor.               Stop taking these medicines if you haven't already. Please contact your care team if you have questions.     metroNIDAZOLE 500 MG tablet   Commonly known as:  FLAGYL   Stopped by:  Rigo Beasley APRN CNM              "       Primary Care Provider    None Specified       No primary provider on file.        Thank you!     Thank you for choosing [facility]  for your care. Our goal is always to provide you with excellent care. Hearing back from our patients is one way we can continue to improve our services. Please take a few minutes to complete the written survey that you may receive in the mail after your visit with us. Thank you!             Your Updated Medication List - Protect others around you: Learn how to safely use, store and throw away your medicines at www.disposemymeds.org.          This list is accurate as of: 3/8/17  9:34 AM.  Always use your most recent med list.                   Brand Name Dispense Instructions for use    albuterol 108 (90 BASE) MCG/ACT Inhaler    PROAIR HFA/PROVENTIL HFA/VENTOLIN HFA    3 Inhaler    Inhale 2 puffs into the lungs every 6 hours as needed for shortness of breath / dyspnea or wheezing       docusate sodium 100 MG tablet    COLACE    60 tablet    Take 100 mg by mouth daily       prenatal multivitamin  plus iron 27-0.8 MG Tabs per tablet     100 tablet    Take 1 tablet by mouth daily

## 2017-03-08 NOTE — PROGRESS NOTES
Julieth Connelly is here for a PNV.  Is haing some dental issues with a hole in her teeth.  Was sent to [facility] but they did not do much due to being sent there from Urgent Care at end of day.   Not having pain in tooth now as has put a OTC  place. so not urgent.  Active baby.   S=D on US.   ASSESSMENT: 23w0d   Dental issue - Needs root canal??   PLAN: RTC in 3 wks for GCT/Hgb at 26 weeks.        NIEVES

## 2017-03-15 ENCOUNTER — PRENATAL OFFICE VISIT (OUTPATIENT)
Dept: MIDWIFE SERVICES | Facility: CLINIC | Age: 22
End: 2017-03-15
Payer: COMMERCIAL

## 2017-03-15 ENCOUNTER — TELEPHONE (OUTPATIENT)
Dept: MIDWIFE SERVICES | Facility: CLINIC | Age: 22
End: 2017-03-15

## 2017-03-15 VITALS
WEIGHT: 214.3 LBS | OXYGEN SATURATION: 97 % | DIASTOLIC BLOOD PRESSURE: 74 MMHG | HEART RATE: 84 BPM | SYSTOLIC BLOOD PRESSURE: 121 MMHG | TEMPERATURE: 97.3 F | HEIGHT: 66 IN | BODY MASS INDEX: 34.44 KG/M2

## 2017-03-15 DIAGNOSIS — O42.90 LEAKAGE, AMNIOTIC FLUID: ICD-10-CM

## 2017-03-15 DIAGNOSIS — Z34.02 ENCOUNTER FOR SUPERVISION OF NORMAL FIRST PREGNANCY IN SECOND TRIMESTER: Primary | ICD-10-CM

## 2017-03-15 LAB
A1 MICROGLOB PLACENTAL VAG QL: NEGATIVE
GLUCOSE 1H P 50 G GLC PO SERPL-MCNC: 121 MG/DL (ref 60–129)
HGB BLD-MCNC: 10 G/DL (ref 11.7–15.7)

## 2017-03-15 PROCEDURE — 00000218 ZZHCL STATISTIC OBHBG - HEMOGLOBIN: Performed by: ADVANCED PRACTICE MIDWIFE

## 2017-03-15 PROCEDURE — 99207 ZZC PRENATAL VISIT: CPT | Performed by: ADVANCED PRACTICE MIDWIFE

## 2017-03-15 PROCEDURE — 82950 GLUCOSE TEST: CPT | Performed by: ADVANCED PRACTICE MIDWIFE

## 2017-03-15 PROCEDURE — 36415 COLL VENOUS BLD VENIPUNCTURE: CPT | Performed by: ADVANCED PRACTICE MIDWIFE

## 2017-03-15 PROCEDURE — 84112 EVAL AMNIOTIC FLUID PROTEIN: CPT | Performed by: ADVANCED PRACTICE MIDWIFE

## 2017-03-15 NOTE — MR AVS SNAPSHOT
"              After Visit Summary   3/15/2017    Julieth Connelly    MRN: [id number]           Patient Information     Date Of Birth          1995        Visit Information        Provider Department      3/15/2017 1:30 PM Maritza Ball APRN CNM [facility]        Today's Diagnoses     Encounter for supervision of normal first pregnancy in second trimester    -  1    Leakage, amniotic fluid           Follow-ups after your visit        Who to contact     If you have questions or need follow up information about today's clinic visit or your schedule please contact [facility] directly at [phone].  Normal or non-critical lab and imaging results will be communicated to you by Cybersourcehart, letter or phone within 4 business days after the clinic has received the results. If you do not hear from us within 7 days, please contact the clinic through Cybersourcehart or phone. If you have a critical or abnormal lab result, we will notify you by phone as soon as possible.  Submit refill requests through [employer] or call your pharmacy and they will forward the refill request to us. Please allow 3 business days for your refill to be completed.          Additional Information About Your Visit        MyChart Information     [employer] lets you send messages to your doctor, view your test results, renew your prescriptions, schedule appointments and more. To sign up, go to [email or web address] . Click on \"Log in\" on the left side of the screen, which will take you to the Welcome page. Then click on \"Sign up Now\" on the right side of the page.     You will be asked to enter the access code listed below, as well as some personal information. Please follow the directions to create your username and password.     Your access code is: [email or web address]  Expires: 2017 12:03 PM     Your access code will  in 90 days. If you need help or a new code, please call your [facility] or [phone].      " "  Care EveryWhere ID     This is your Care EveryWhere ID. This could be used by other organizations to access your [place] medical records  [id number]        Your Vitals Were     Pulse Temperature Height Pulse Oximetry BMI (Body Mass Index)       84 97.3  F (36.3  C) (Oral) 5' 5.5\" (1.664 m) 97% 35.12 kg/m2        Blood Pressure from Last 3 Encounters:   03/15/17 121/74   03/08/17 132/80   02/20/17 113/69    Weight from Last 3 Encounters:   03/15/17 214 lb 4.8 oz (97.2 kg) (98 %)*   03/08/17 210 lb (95.3 kg) (98 %)*   02/20/17 209 lb (94.8 kg) (98 %)*     * Growth percentiles are based on [facility] 2-20 Years data.              We Performed the Following     Glucose tolerance gest screen 1 hour     OB hemoglobin     Rupture of membranes by Amnisure        Primary Care Provider    None Specified       No primary provider on file.        Thank you!     Thank you for choosing [facility]  for your care. Our goal is always to provide you with excellent care. Hearing back from our patients is one way we can continue to improve our services. Please take a few minutes to complete the written survey that you may receive in the mail after your visit with us. Thank you!             Your Updated Medication List - Protect others around you: Learn how to safely use, store and throw away your medicines at www.disposemymeds.org.          This list is accurate as of: 3/15/17  2:23 PM.  Always use your most recent med list.                   Brand Name Dispense Instructions for use    albuterol 108 (90 BASE) MCG/ACT Inhaler    PROAIR HFA/PROVENTIL HFA/VENTOLIN HFA    3 Inhaler    Inhale 2 puffs into the lungs every 6 hours as needed for shortness of breath / dyspnea or wheezing Reported on 3/15/2017       docusate sodium 100 MG tablet    COLACE    60 tablet    Take 100 mg by mouth daily       prenatal multivitamin  plus iron 27-0.8 MG Tabs per tablet     100 tablet    Take 1 tablet by mouth daily         "

## 2017-03-15 NOTE — PROGRESS NOTES
Here a day early for an acute visit secondary to feeling 'wet' and concerned her bag of water was broken. Discussed other reasons at 24 wks that someone may feel more wet.   Amnisure collected, perineum and labia minora appear dry during collection, pt denies contractions.  Review of fetal movement, during exam could palpate and auscultate movement, review fetal movement patterns.  GCT and hgb today, blood type O+, no rhogam needed.  RTC 4 wks JR

## 2017-03-15 NOTE — TELEPHONE ENCOUNTER
Per JR: I can see her in clinic.     TC to patient. Pt scheduled at 1:30pm.   Odalis Tran, RN-BSN

## 2017-03-15 NOTE — TELEPHONE ENCOUNTER
Patient's 24w0d, c/o leaking fluids x 3 days. Enough to make her underwear wet. No vaginal bleeding. Routing to on-call provider. Can you see pt in clinic and do amnisure? Or do you want her to go to L&D? Thanks.   Odalis Tran, RN-BSN

## 2017-03-15 NOTE — NURSING NOTE
Prenatal Breastfeeding Education Toolkit provided for patient to review, helping her to make an informed decision on a feeding choice for her baby. Content of toolkit  Includes: benefits of breastfeeding, importance of skin to skin and rooming in.  Questions directed to her provider.      Syphilis is a sexually transmitted disease that can cause birth defects in the babies of untreated mothers. Every pregnant patient is tested for syphilis early in each pregnancy as part of the routine lab work. The [place] [facility] of [facility] has seen an increase in the rate of syphilis in [place]. The [facility] now recommends testing for syphilis 3 times during a pregnancy, the new prenatal visit, 28 weeks and when admitted for delivery. Patient declines lab testing for syphilis.

## 2017-04-12 ENCOUNTER — PRENATAL OFFICE VISIT (OUTPATIENT)
Dept: OBGYN | Facility: CLINIC | Age: 22
End: 2017-04-12
Payer: COMMERCIAL

## 2017-04-12 DIAGNOSIS — Z23 NEED FOR TDAP VACCINATION: Primary | ICD-10-CM

## 2017-04-12 DIAGNOSIS — Z34.02 ENCOUNTER FOR SUPERVISION OF NORMAL FIRST PREGNANCY IN SECOND TRIMESTER: ICD-10-CM

## 2017-04-12 PROCEDURE — 59425 ANTEPARTUM CARE ONLY: CPT | Performed by: ADVANCED PRACTICE MIDWIFE

## 2017-04-12 PROCEDURE — 99207 ZZC PRENATAL VISIT: CPT | Performed by: ADVANCED PRACTICE MIDWIFE

## 2017-04-12 PROCEDURE — 90471 IMMUNIZATION ADMIN: CPT | Performed by: ADVANCED PRACTICE MIDWIFE

## 2017-04-12 NOTE — MR AVS SNAPSHOT
"              After Visit Summary   2017    Julieth Connelly    MRN: [id number]           Patient Information     Date Of Birth          1995        Visit Information        Provider Department      2017 11:00 AM Rigo Beasley APRN CNM [facility]        Today's Diagnoses     Need for Tdap vaccination    -  1    Encounter for supervision of normal first pregnancy in second trimester           Follow-ups after your visit        Who to contact     If you have questions or need follow up information about today's clinic visit or your schedule please contact [facility] directly at [phone].  Normal or non-critical lab and imaging results will be communicated to you by [employer] Nite Labshart, letter or phone within 4 business days after the clinic has received the results. If you do not hear from us within 7 days, please contact the clinic through [employer] N[employer]art or phone. If you have a critical or abnormal lab result, we will notify you by phone as soon as possible.  Submit refill requests through [employer] or call your pharmacy and they will forward the refill request to us. Please allow 3 business days for your refill to be completed.          Additional Information About Your Visit        MyChart Information     [employer] lets you send messages to your doctor, view your test results, renew your prescriptions, schedule appointments and more. To sign up, go to [email or web address] . Click on \"Log in\" on the left side of the screen, which will take you to the Welcome page. Then click on \"Sign up Now\" on the right side of the page.     You will be asked to enter the access code listed below, as well as some personal information. Please follow the directions to create your username and password.     Your access code is: [email or web address]  Expires: 2017 12:03 PM     Your access code will  in 90 days. If you need help or a new code, please call your [facility] or [phone].        Care " EveryWhere ID     This is your Care EveryWhere ID. This could be used by other organizations to access your [place] medical records  [id number]        Your Vitals Were     BMI (Body Mass Index)                   36.54 kg/m2            Blood Pressure from Last 3 Encounters:   04/14/17 130/80   03/15/17 121/74   03/08/17 132/80    Weight from Last 3 Encounters:   04/14/17 223 lb (101.2 kg)   03/15/17 214 lb 4.8 oz (97.2 kg) (98 %)*   03/08/17 210 lb (95.3 kg) (98 %)*     * Growth percentiles are based on [facility] 2-20 Years data.              We Performed the Following     TDAP VACCINE (BOOSTRIX)        Primary Care Provider    None Specified       No primary provider on file.        Thank you!     Thank you for choosing [facility]  for your care. Our goal is always to provide you with excellent care. Hearing back from our patients is one way we can continue to improve our services. Please take a few minutes to complete the written survey that you may receive in the mail after your visit with us. Thank you!             Your Updated Medication List - Protect others around you: Learn how to safely use, store and throw away your medicines at www.disposemymeds.org.          This list is accurate as of: 4/12/17 11:59 PM.  Always use your most recent med list.                   Brand Name Dispense Instructions for use    albuterol 108 (90 BASE) MCG/ACT Inhaler    PROAIR HFA/PROVENTIL HFA/VENTOLIN HFA    3 Inhaler    Inhale 2 puffs into the lungs every 6 hours as needed for shortness of breath / dyspnea or wheezing Reported on 3/15/2017       docusate sodium 100 MG tablet    COLACE    60 tablet    Take 100 mg by mouth daily       prenatal multivitamin  plus iron 27-0.8 MG Tabs per tablet     100 tablet    Take 1 tablet by mouth daily

## 2017-04-14 VITALS — DIASTOLIC BLOOD PRESSURE: 80 MMHG | BODY MASS INDEX: 36.54 KG/M2 | SYSTOLIC BLOOD PRESSURE: 130 MMHG | WEIGHT: 223 LBS

## 2017-04-14 PROBLEM — Z23 NEED FOR TDAP VACCINATION: Status: ACTIVE | Noted: 2017-04-14

## 2017-04-14 PROCEDURE — 90715 TDAP VACCINE 7 YRS/> IM: CPT | Performed by: ADVANCED PRACTICE MIDWIFE

## 2017-04-14 NOTE — PROGRESS NOTES
Here for PNC at [facility].  Discussed wt gain over last 4 wks.  Has cut out juice and drinking more water.  Reviewed diet.  Heavy in pasta, potatoes, rice so discussed limited use of these and more veggies with these items.  Tdap done today.  Passed GCT at 121mg/dl.  ASSESSMENT: 28w.  PLAN: Tdap today and RTC in 4 wks.   NIEVES

## 2017-05-10 ENCOUNTER — PRENATAL OFFICE VISIT (OUTPATIENT)
Dept: OBGYN | Facility: CLINIC | Age: 22
End: 2017-05-10
Payer: COMMERCIAL

## 2017-05-10 VITALS — DIASTOLIC BLOOD PRESSURE: 74 MMHG | BODY MASS INDEX: 36.54 KG/M2 | SYSTOLIC BLOOD PRESSURE: 120 MMHG | WEIGHT: 223 LBS

## 2017-05-10 DIAGNOSIS — Z34.02 ENCOUNTER FOR SUPERVISION OF NORMAL FIRST PREGNANCY IN SECOND TRIMESTER: Primary | ICD-10-CM

## 2017-05-10 PROCEDURE — 99207 ZZC PRENATAL VISIT: CPT | Performed by: ADVANCED PRACTICE MIDWIFE

## 2017-05-10 NOTE — PROGRESS NOTES
Feeling well.  Baby is active. Denies any leaking of fluid, vaginal bleeding, regular uterine contractions, or headaches or other concerns.  Baby might be breech.  Continue to watch.    Having some headaches.  Discussed possible causes and strategies including hydration, checking vision, a little caffeine, Tylenol.    Reviewed to call [phone] for contractions, loss of fluid, vaginal bleeding, decreased fetal movement or any other questions or concerns.    RTC in 2 weeks.  Jill Gonzalez, RADHA, APRN, CNM

## 2017-05-10 NOTE — MR AVS SNAPSHOT
"              After Visit Summary   5/10/2017    Julieth Connelly    MRN: [id number]           Patient Information     Date Of Birth          1995        Visit Information        Provider Department      5/10/2017 10:00 AM Jill Gonzalez CNM [facility]        Today's Diagnoses     Encounter for supervision of normal first pregnancy in third trimester    -  1       Follow-ups after your visit        Follow-up notes from your care team     Return in about 2 weeks (around 2017) for Prenatal care with FILIPE.      Who to contact     If you have questions or need follow up information about today's clinic visit or your schedule please contact [facility] directly at [phone].  Normal or non-critical lab and imaging results will be communicated to you by MyChart, letter or phone within 4 business days after the clinic has received the results. If you do not hear from us within 7 days, please contact the clinic through ShopLockethart or phone. If you have a critical or abnormal lab result, we will notify you by phone as soon as possible.  Submit refill requests through [employer] or call your pharmacy and they will forward the refill request to us. Please allow 3 business days for your refill to be completed.          Additional Information About Your Visit        MyChart Information     [employer] lets you send messages to your doctor, view your test results, renew your prescriptions, schedule appointments and more. To sign up, go to [email or web address] . Click on \"Log in\" on the left side of the screen, which will take you to the Welcome page. Then click on \"Sign up Now\" on the right side of the page.     You will be asked to enter the access code listed below, as well as some personal information. Please follow the directions to create your username and password.     Your access code is: [email or web address]  Expires: 2017 12:03 PM     Your access code will  in 90 days. If " you need help or a new code, please call your [facility] or [phone].        Care EveryWhere ID     This is your Care EveryWhere ID. This could be used by other organizations to access your [place] medical records  [id number]        Your Vitals Were     BMI (Body Mass Index)                   36.54 kg/m2            Blood Pressure from Last 3 Encounters:   05/10/17 120/74   04/14/17 130/80   03/15/17 121/74    Weight from Last 3 Encounters:   05/10/17 223 lb (101.2 kg)   04/14/17 223 lb (101.2 kg)   03/15/17 214 lb 4.8 oz (97.2 kg) (98 %)*     * Growth percentiles are based on CDC 2-20 Years data.              Today, you had the following     No orders found for display       Primary Care Provider Office Phone # Fax #    Alethea Schmid [phone]       [address] [place] [address] [place] [address]        Thank you!     Thank you for choosing [facility]  for your care. Our goal is always to provide you with excellent care. Hearing back from our patients is one way we can continue to improve our services. Please take a few minutes to complete the written survey that you may receive in the mail after your visit with us. Thank you!             Your Updated Medication List - Protect others around you: Learn how to safely use, store and throw away your medicines at www.disposemymeds.org.          This list is accurate as of: 5/10/17 11:53 AM.  Always use your most recent med list.                   Brand Name Dispense Instructions for use    albuterol 108 (90 BASE) MCG/ACT Inhaler    PROAIR HFA/PROVENTIL HFA/VENTOLIN HFA    3 Inhaler    Inhale 2 puffs into the lungs every 6 hours as needed for shortness of breath / dyspnea or wheezing Reported on 3/15/2017       docusate sodium 100 MG tablet    COLACE    60 tablet    Take 100 mg by mouth daily       prenatal multivitamin  plus iron 27-0.8 MG Tabs per tablet     100 tablet    Take 1 tablet by mouth daily

## 2017-05-18 ENCOUNTER — PRENATAL OFFICE VISIT (OUTPATIENT)
Dept: MIDWIFE SERVICES | Facility: CLINIC | Age: 22
End: 2017-05-18
Payer: COMMERCIAL

## 2017-05-18 VITALS
BODY MASS INDEX: 36.54 KG/M2 | SYSTOLIC BLOOD PRESSURE: 129 MMHG | TEMPERATURE: 98.4 F | WEIGHT: 223 LBS | HEART RATE: 93 BPM | DIASTOLIC BLOOD PRESSURE: 83 MMHG

## 2017-05-18 DIAGNOSIS — D64.9 ANEMIA, UNSPECIFIED TYPE: ICD-10-CM

## 2017-05-18 DIAGNOSIS — Z34.83 ENCOUNTER FOR SUPERVISION OF OTHER NORMAL PREGNANCY IN THIRD TRIMESTER: Primary | ICD-10-CM

## 2017-05-18 LAB
ALT SERPL W P-5'-P-CCNC: 20 U/L (ref 0–50)
AST SERPL W P-5'-P-CCNC: 18 U/L (ref 0–45)
CREAT SERPL-MCNC: 0.58 MG/DL (ref 0.52–1.04)
CREAT UR-MCNC: 48 MG/DL
ERYTHROCYTE [DISTWIDTH] IN BLOOD BY AUTOMATED COUNT: 14.6 % (ref 10–15)
GFR SERPL CREATININE-BSD FRML MDRD: NORMAL ML/MIN/1.7M2
HCT VFR BLD AUTO: 32.3 % (ref 35–47)
HGB BLD-MCNC: 10.4 G/DL (ref 11.7–15.7)
MCH RBC QN AUTO: 25.6 PG (ref 26.5–33)
MCHC RBC AUTO-ENTMCNC: 32.2 G/DL (ref 31.5–36.5)
MCV RBC AUTO: 79 FL (ref 78–100)
PLATELET # BLD AUTO: 296 10E9/L (ref 150–450)
PROT UR-MCNC: 0.07 G/L
PROT/CREAT 24H UR: 0.15 G/G CR (ref 0–0.2)
RBC # BLD AUTO: 4.07 10E12/L (ref 3.8–5.2)
WBC # BLD AUTO: 10.4 10E9/L (ref 4–11)

## 2017-05-18 PROCEDURE — 85027 COMPLETE CBC AUTOMATED: CPT | Performed by: ADVANCED PRACTICE MIDWIFE

## 2017-05-18 PROCEDURE — 82565 ASSAY OF CREATININE: CPT | Performed by: ADVANCED PRACTICE MIDWIFE

## 2017-05-18 PROCEDURE — 36415 COLL VENOUS BLD VENIPUNCTURE: CPT | Performed by: ADVANCED PRACTICE MIDWIFE

## 2017-05-18 PROCEDURE — 84156 ASSAY OF PROTEIN URINE: CPT | Performed by: ADVANCED PRACTICE MIDWIFE

## 2017-05-18 PROCEDURE — 99207 ZZC PRENATAL VISIT: CPT | Performed by: ADVANCED PRACTICE MIDWIFE

## 2017-05-18 PROCEDURE — 84460 ALANINE AMINO (ALT) (SGPT): CPT | Performed by: ADVANCED PRACTICE MIDWIFE

## 2017-05-18 PROCEDURE — 84450 TRANSFERASE (AST) (SGOT): CPT | Performed by: ADVANCED PRACTICE MIDWIFE

## 2017-05-18 RX ORDER — FERROUS SULFATE 325(65) MG
325 TABLET ORAL 2 TIMES DAILY
Qty: 100 TABLET | Refills: 1 | Status: SHIPPED | OUTPATIENT
Start: 2017-05-18 | End: 2017-06-07

## 2017-05-18 NOTE — MR AVS SNAPSHOT
"              After Visit Summary   2017    Julieth Connelly    MRN: [id number]           Patient Information     Date Of Birth          1995        Visit Information        Provider Department      2017 12:15 PM Aubrie Carlson APRN CNM [facility]        Today's Diagnoses     Encounter for supervision of other normal pregnancy in third trimester    -  1    Anemia, unspecified type           Follow-ups after your visit        Who to contact     If you have questions or need follow up information about today's clinic visit or your schedule please contact [facility] directly at [phone].  Normal or non-critical lab and imaging results will be communicated to you by [employer], letter or phone within 4 business days after the clinic has received the results. If you do not hear from us within 7 days, please contact the clinic through [employer] or phone. If you have a critical or abnormal lab result, we will notify you by phone as soon as possible.  Submit refill requests through [employer] or call your pharmacy and they will forward the refill request to us. Please allow 3 business days for your refill to be completed.          Additional Information About Your Visit        MyChart Information     [employer] lets you send messages to your doctor, view your test results, renew your prescriptions, schedule appointments and more. To sign up, go to [email or web address] . Click on \"Log in\" on the left side of the screen, which will take you to the Welcome page. Then click on \"Sign up Now\" on the right side of the page.     You will be asked to enter the access code listed below, as well as some personal information. Please follow the directions to create your username and password.     Your access code is: [email or web address]  Expires: 2017 12:03 PM     Your access code will  in 90 days. If you need help or a new code, please call your [facility] or [phone].   "      Care EveryWhere ID     This is your Care EveryWhere ID. This could be used by other organizations to access your [facility] medical records  [id number]        Your Vitals Were     Pulse Temperature BMI (Body Mass Index)             93 98.4  F (36.9  C) (Oral) 36.54 kg/m2          Blood Pressure from Last 3 Encounters:   05/18/17 129/83   05/10/17 120/74   04/14/17 130/80    Weight from Last 3 Encounters:   05/18/17 223 lb (101.2 kg)   05/10/17 223 lb (101.2 kg)   04/14/17 223 lb (101.2 kg)              We Performed the Following     ALT     AST     CBC with platelets     Creatinine     Protein  random urine          Today's Medication Changes          These changes are accurate as of: 5/18/17 12:48 PM.  If you have any questions, ask your nurse or doctor.               Start taking these medicines.        Dose/Directions    ferrous sulfate 325 (65 FE) MG tablet   Commonly known as:  IRON   Used for:  Anemia, unspecified type   Started by:  Aubrie Carlson APRN CNM        Dose:  325 mg   Take 1 tablet (325 mg) by mouth 2 times daily   Quantity:  100 tablet   Refills:  1            Where to get your medicines      These medications were sent to [facility]/pharmacy #[id number] - [place], [place] - [address]  [address] [address]     Phone:  [phone]     ferrous sulfate 325 (65 FE) MG tablet                Primary Care Provider Office Phone # Fax #    Alethea Schmid [phone]       [address]   [place] [facility] [address]        Thank you!     Thank you for choosing [facility]  [place] your [place]. Our goal is always to provide you with excellent care. Hearing back from our patients is one way we can continue to improve our services. Please take a few minutes to complete the written survey that you may receive in the mail after your visit with us. Thank you!             Your Updated Medication List - Protect others around you: Learn how  to safely use, store and throw away your medicines at www.disposemymeds.org.          This list is accurate as of: 5/18/17 12:48 PM.  Always use your most recent med list.                   Brand Name Dispense Instructions for use    albuterol 108 (90 BASE) MCG/ACT Inhaler    PROAIR HFA/PROVENTIL HFA/VENTOLIN HFA    3 Inhaler    Inhale 2 puffs into the lungs every 6 hours as needed for shortness of breath / dyspnea or wheezing Reported on 3/15/2017       docusate sodium 100 MG tablet    COLACE    60 tablet    Take 100 mg by mouth daily       ferrous sulfate 325 (65 FE) MG tablet    IRON    100 tablet    Take 1 tablet (325 mg) by mouth 2 times daily       prenatal multivitamin  plus iron 27-0.8 MG Tabs per tablet     100 tablet    Take 1 tablet by mouth daily

## 2017-05-18 NOTE — PROGRESS NOTES
33w1d   Patient is here for extra visit today for upper right quadrant pain and headaches. The patient reports she started getting a headache 4 days ago. She made an appointment to come be seen that day but cancelled because the headache resolved. She then developed the headache on and off since. She has tried one 250mg of tylenol and warm/cold packs but not having resolution of this. She is also complaining of right upper quadrant pain which is more constant. The pain gets worse with movement, like sitting up from laying down and also hurts more when laying done. Discussed pre-eclampsia with patient. BP today is WNL. Will draw labs today and follow up with patient this afternoon with results. Discussed coming in if headaches does not resolve with 650 mg of tylenol, nap, and getting something to eat. Also call or come in with increased RUQ pain or other concerns. Patient reports understanding of this plan of care.   Positive fetal movement. Denies water leaking, vaginal bleeding, decreased fetal movement, or contraction pain.   Danger signs reviewed, pre-eclampsia signs and symptoms discussed.   Knows when to call triage and has phone numbers.   Follow up in 1 week.   Aubrie Carlson CNM

## 2017-05-19 ENCOUNTER — TELEPHONE (OUTPATIENT)
Dept: NURSING | Facility: CLINIC | Age: 22
End: 2017-05-19

## 2017-05-19 ENCOUNTER — HOSPITAL ENCOUNTER (OUTPATIENT)
Facility: CLINIC | Age: 22
Discharge: HOME | End: 2017-05-19
Attending: ADVANCED PRACTICE MIDWIFE | Admitting: ADVANCED PRACTICE MIDWIFE
Payer: COMMERCIAL

## 2017-05-19 ENCOUNTER — TELEPHONE (OUTPATIENT)
Dept: OBGYN | Facility: CLINIC | Age: 22
End: 2017-05-19

## 2017-05-19 VITALS — SYSTOLIC BLOOD PRESSURE: 117 MMHG | TEMPERATURE: 98.5 F | DIASTOLIC BLOOD PRESSURE: 81 MMHG

## 2017-05-19 DIAGNOSIS — R10.11 RUQ ABDOMINAL PAIN: Primary | ICD-10-CM

## 2017-05-19 LAB
ALBUMIN UR-MCNC: NEGATIVE MG/DL
ALT SERPL W P-5'-P-CCNC: 17 U/L (ref 0–50)
ANION GAP SERPL CALCULATED.3IONS-SCNC: 9 MMOL/L (ref 3–14)
APPEARANCE UR: CLEAR
AST SERPL W P-5'-P-CCNC: 17 U/L (ref 0–45)
BACTERIA #/AREA URNS HPF: ABNORMAL /HPF
BILIRUB UR QL STRIP: NEGATIVE
BUN SERPL-MCNC: 5 MG/DL (ref 7–30)
CALCIUM SERPL-MCNC: 8.4 MG/DL (ref 8.5–10.1)
CHLORIDE SERPL-SCNC: 108 MMOL/L (ref 94–109)
CO2 SERPL-SCNC: 24 MMOL/L (ref 20–32)
COLOR UR AUTO: ABNORMAL
CREAT SERPL-MCNC: 0.54 MG/DL (ref 0.52–1.04)
ERYTHROCYTE [DISTWIDTH] IN BLOOD BY AUTOMATED COUNT: 13.8 % (ref 10–15)
GFR SERPL CREATININE-BSD FRML MDRD: ABNORMAL ML/MIN/1.7M2
GLUCOSE SERPL-MCNC: 81 MG/DL (ref 70–99)
GLUCOSE UR STRIP-MCNC: NEGATIVE MG/DL
HCT VFR BLD AUTO: 31.5 % (ref 35–47)
HGB BLD-MCNC: 10.2 G/DL (ref 11.7–15.7)
HGB UR QL STRIP: NEGATIVE
KETONES UR STRIP-MCNC: NEGATIVE MG/DL
LEUKOCYTE ESTERASE UR QL STRIP: NEGATIVE
MCH RBC QN AUTO: 25.8 PG (ref 26.5–33)
MCHC RBC AUTO-ENTMCNC: 32.4 G/DL (ref 31.5–36.5)
MCV RBC AUTO: 80 FL (ref 78–100)
MICRO REPORT STATUS: NORMAL
NITRATE UR QL: NEGATIVE
PH UR STRIP: 7 PH (ref 5–7)
PLATELET # BLD AUTO: 266 10E9/L (ref 150–450)
POTASSIUM SERPL-SCNC: 3.9 MMOL/L (ref 3.4–5.3)
RBC # BLD AUTO: 3.96 10E12/L (ref 3.8–5.2)
RBC #/AREA URNS AUTO: 1 /HPF (ref 0–2)
SODIUM SERPL-SCNC: 141 MMOL/L (ref 133–144)
SP GR UR STRIP: 1.01 (ref 1–1.03)
SPECIMEN SOURCE: NORMAL
SQUAMOUS #/AREA URNS AUTO: <1 /HPF (ref 0–1)
URN SPEC COLLECT METH UR: ABNORMAL
UROBILINOGEN UR STRIP-MCNC: NORMAL MG/DL (ref 0–2)
WBC # BLD AUTO: 8.6 10E9/L (ref 4–11)
WBC #/AREA URNS AUTO: 1 /HPF (ref 0–2)
WET PREP SPEC: NORMAL

## 2017-05-19 PROCEDURE — 84450 TRANSFERASE (AST) (SGOT): CPT | Performed by: ADVANCED PRACTICE MIDWIFE

## 2017-05-19 PROCEDURE — 99214 OFFICE O/P EST MOD 30 MIN: CPT | Mod: 25

## 2017-05-19 PROCEDURE — 59025 FETAL NON-STRESS TEST: CPT | Mod: 26 | Performed by: ADVANCED PRACTICE MIDWIFE

## 2017-05-19 PROCEDURE — 87491 CHLMYD TRACH DNA AMP PROBE: CPT | Performed by: ADVANCED PRACTICE MIDWIFE

## 2017-05-19 PROCEDURE — 36415 COLL VENOUS BLD VENIPUNCTURE: CPT | Performed by: ADVANCED PRACTICE MIDWIFE

## 2017-05-19 PROCEDURE — 80048 BASIC METABOLIC PNL TOTAL CA: CPT | Performed by: ADVANCED PRACTICE MIDWIFE

## 2017-05-19 PROCEDURE — 85027 COMPLETE CBC AUTOMATED: CPT | Performed by: ADVANCED PRACTICE MIDWIFE

## 2017-05-19 PROCEDURE — 99214 OFFICE O/P EST MOD 30 MIN: CPT | Mod: 25 | Performed by: ADVANCED PRACTICE MIDWIFE

## 2017-05-19 PROCEDURE — 84460 ALANINE AMINO (ALT) (SGPT): CPT | Performed by: ADVANCED PRACTICE MIDWIFE

## 2017-05-19 PROCEDURE — 87591 N.GONORRHOEAE DNA AMP PROB: CPT | Performed by: ADVANCED PRACTICE MIDWIFE

## 2017-05-19 PROCEDURE — 81001 URINALYSIS AUTO W/SCOPE: CPT | Performed by: ADVANCED PRACTICE MIDWIFE

## 2017-05-19 PROCEDURE — 87210 SMEAR WET MOUNT SALINE/INK: CPT | Performed by: ADVANCED PRACTICE MIDWIFE

## 2017-05-19 PROCEDURE — 59025 FETAL NON-STRESS TEST: CPT

## 2017-05-19 RX ORDER — ONDANSETRON 2 MG/ML
4 INJECTION INTRAMUSCULAR; INTRAVENOUS EVERY 6 HOURS PRN
Status: DISCONTINUED | OUTPATIENT
Start: 2017-05-19 | End: 2017-05-19 | Stop reason: HOSPADM

## 2017-05-19 RX ORDER — OXYCODONE AND ACETAMINOPHEN 5; 325 MG/1; MG/1
1-2 TABLET ORAL EVERY 6 HOURS PRN
Qty: 24 TABLET | Refills: 0 | Status: SHIPPED | OUTPATIENT
Start: 2017-05-19 | End: 2017-06-07

## 2017-05-19 NOTE — IP AVS SNAPSHOT
[facility]    [address]    [facility] [place] [address]    Phone:  [phone]                                       After Visit Summary   5/19/2017    Julieth Connlely    MRN: [id number]           After Visit Summary Signature Page     I have received my discharge instructions, and my questions have been answered. I have discussed any challenges I see with this plan with the nurse or doctor.    ..........................................................................................................................................  Patient/Patient Representative Signature      ..........................................................................................................................................  Patient Representative Print Name and Relationship to Patient    ..................................................               ................................................  Date                                            Time    ..........................................................................................................................................  Reviewed by Signature/Title    ...................................................              ..............................................  Date                                                            Time

## 2017-05-19 NOTE — LETTER
[facility]  [address]  [place] [address]  [phone]        May 19, 2017      Julieth Connelly  [address] [facility] [address]      To Whom it May Concern:      Julieth Connelly was seen in the hospital on 5/19/17 and was given the following instructions:    Patient should be excused from work today (5/19/17) and Monday (5/22/17). She is experiencing abdominal pain and has an appointment for follow up scheduled on Monday.       Medical Indication:   RUQ pain at 33 weeks gestation          Sincerely,          Mag Espinosa CNM

## 2017-05-19 NOTE — TELEPHONE ENCOUNTER
Call Type: Triage Call    Presenting Problem: 33 weeks pregnant, GLEN 17 and followed by  midwives  . See yesterday = right sided abdominal pain and every  thng was fine with pregnant.  Today having severe nausea and  constant right sided abdominal pain is 8.5/10 on painscale.   Smart  web paged  midwife Mag Espinosa to  Patient's [phone] cell  @723 & 725 AM .  Triage Note:  Guideline Title: Pregnancy:  Labor, 20 to 37 Weeks  Recommended Disposition: Activate   Original Inclination: Did not know what to do  Override Disposition:  Intended Action: Call PCP/HCP  Physician Contacted: No  Unbearable abdominal/pelvic pain ?  YES  New or worsening signs and symptoms that may indicate shock ? NO  Umbilical cord or any part of the baby (head, bottom, arm or leg) at the opening  of the vagina ? NO  No relief between contractions ? NO  Continuous bright red vaginal bleeding for more than 15 minutes (more than  spotting) ? NO  Physician Instructions:  Care Advice: Do not give the patient anything to eat or drink.  IMMEDIATE ACTION  Lie on left side to improve circulation to the fetus.

## 2017-05-19 NOTE — IP AVS SNAPSHOT
MRN:[id number]                      After Visit Summary   5/19/2017    Julieth Connelly    MRN: [id number]           Thank you!     Thank you for choosing [facility] for your care. Our goal is always to provide you with excellent care. Hearing back from our patients is one way we can continue to improve our services. Please take a few minutes to complete the written survey that you may receive in the mail after you visit with us. Thank you!        Patient Information     Date Of Birth          1995        Designated Caregiver       Most Recent Value    Caregiver    Will someone help with your care after discharge? no      About your hospital stay     You were admitted on:  May 19, 2017 You last received care in the:  [facility]    You were discharged on:  May 19, 2017       Who to Call     For medical emergencies, please call 911.  For non-urgent questions about your medical care, please call your primary care provider or clinic, [phone]          Attending Provider     Provider Specialty    Mag Espinosa APRN CNM MIDWIFE       Primary Care Provider Office Phone # Fax #    Alethea Schmid [phone]       [address]   [facility] [address]        Your next 10 appointments already scheduled     May 22, 2017  8:00 AM CDT   US ABDOMEN LIMITED with URUS1   [facility], Ultrasound ([facility])    [facility] [address] [phone]           Please bring a list of your medicines (including vitamins, minerals and over-the-counter drugs). Also, tell your doctor about any allergies you may have. Wear comfortable clothes and leave your valuables at home.  Adults: No eating or drinking for 8 hours before the exam. You may take medicine with a small sip of water.  Children: - Children 6+ years: No food or drink for 6 hours before exam. - Children 1-5 years: No food or drink for  4 hours before exam. - Infants, breast-fed: may have breast milk up to 2 hours before exam. - Infants, formula: may have bottle until 4 hours before exam.  Please call the Imaging Department at your exam site with any questions.              Further instructions from your care team       Discharge Instruction for Undelivered Patients      You were seen for: abdominal pain.  You were seen by Mag Espinosa CNM.  You had (Test or Medicine):blood work and baby monitoring.     Diet:   Drink 8 to 12 glasses of liquids (milk, juice, water) every day.  You may eat meals and snacks.     Activity:  Count fetal kicks everyday (see handout)  Call your doctor or nurse midwife if your baby is moving less than usual.     Call your provider if you notice:  Swelling in your face or increased swelling in your hands or legs.  Headaches that are not relieved by Tylenol (acetaminophen).  Changes in your vision (blurring: seeing spots or stars.)  Nausea (sick to your stomach) and vomiting (throwing up).   Weight gain of 5 pounds or more per week.  Heartburn that doesn't go away.  Signs of bladder infection: pain when you urinate (use the toilet), need to go more often and more urgently.  The bag of merida (rupture of membranes) breaks, or you notice leaking in your underwear.  Bright red blood in your underwear.  Abdominal (lower belly) or stomach pain.  For first baby: Contractions (tightening) less than 5 minutes apart for one hour or more.  If less than 34 weeks: Contractions (tightenings) more than 6 times in one hour.  Increase or change in vaginal discharge (note the color and amount)  Other: Do not eat or drink anything for 8 hours before your procedure on Monday, starting at midnight on Sunday 5/21/17.    Follow-up:  As scheduled in the clinic for OB and Monday, May 22nd at 0800 for ultrasound of abdomen.          Pending Results     Date and Time Order Name Status Description    5/19/2017 1011 Neisseria gonorrhoea PCR In process  "    2017 1011 Chlamydia trachomatis PCR In process     2017 1010 UA with Microscopic reflex to Culture In process             Statement of Approval     Ordered          17 1058  I have reviewed and agree with all the recommendations and orders detailed in this document.  EFFECTIVE NOW     Approved and electronically signed by:  Mag Espinosa APRN CNM             Admission Information     Date & Time Provider Department Dept. Phone    2017 aMg Espinosa APRN CNM [facility] [phone]      Your Vitals Were     Blood Pressure Temperature                117/81 98.5  F (36.9  C) (Oral)          MyChart Information     [employer] lets you send messages to your doctor, view your test results, renew your prescriptions, schedule appointments and more. To sign up, go to [email or web address] . Click on \"Log in\" on the left side of the screen, which will take you to the Welcome page. Then click on \"Sign up Now\" on the right side of the page.     You will be asked to enter the access code listed below, as well as some personal information. Please follow the directions to create your username and password.     Your access code is: [email or web address]  Expires: 2017 12:03 PM     Your access code will  in 90 days. If you need help or a new code, please call your [facility] clinic or [phone].        Care EveryWhere ID     This is your Care EveryWhere ID. This could be used by other organizations to access your [facility] medical records  [id number]           Review of your medicines      START taking        Dose / Directions    oxyCODONE-acetaminophen 5-325 MG per tablet   Commonly known as:  PERCOCET   Used for:  RUQ abdominal pain        Dose:  1-2 tablet   Take 1-2 tablets by mouth every 6 hours as needed for moderate to severe pain   Quantity:  24 tablet   Refills:  0         CONTINUE these medicines which have NOT CHANGED        Dose / Directions    albuterol 108 (90 BASE) MCG/ACT Inhaler "   Commonly known as:  PROAIR HFA/PROVENTIL HFA/VENTOLIN HFA        Dose:  2 puff   Inhale 2 puffs into the lungs every 6 hours as needed for shortness of breath / dyspnea or wheezing Reported on 3/15/2017   Quantity:  3 Inhaler   Refills:  1       docusate sodium 100 MG tablet   Commonly known as:  COLACE   Used for:  Chronic constipation        Dose:  100 mg   Take 100 mg by mouth daily   Quantity:  60 tablet   Refills:  3       ferrous sulfate 325 (65 FE) MG tablet   Commonly known as:  IRON   Used for:  Anemia, unspecified type        Dose:  325 mg   Take 1 tablet (325 mg) by mouth 2 times daily   Quantity:  100 tablet   Refills:  1       prenatal multivitamin  plus iron 27-0.8 MG Tabs per tablet   Used for:  Mild intermittent asthma without complication        Dose:  1 tablet   Take 1 tablet by mouth daily   Quantity:  100 tablet   Refills:  3       TYLENOL PO   Indication:  Pain        Dose:  500 mg   Take 500 mg by mouth every 4 hours as needed for mild pain or fever   Refills:  0            Where to get your medicines      Some of these will need a paper prescription and others can be bought over the counter. Ask your nurse if you have questions.     Bring a paper prescription for each of these medications     oxyCODONE-acetaminophen 5-325 MG per tablet                Protect others around you: Learn how to safely use, store and throw away your medicines at www.disposemymeds.org.             Medication List: This is a list of all your medications and when to take them. Check marks below indicate your daily home schedule. Keep this list as a reference.      Medications           Morning Afternoon Evening Bedtime As Needed    albuterol 108 (90 BASE) MCG/ACT Inhaler   Commonly known as:  PROAIR HFA/PROVENTIL HFA/VENTOLIN HFA   Inhale 2 puffs into the lungs every 6 hours as needed for shortness of breath / dyspnea or wheezing Reported on 3/15/2017                                docusate sodium 100 MG tablet    Commonly known as:  COLACE   Take 100 mg by mouth daily                                ferrous sulfate 325 (65 FE) MG tablet   Commonly known as:  IRON   Take 1 tablet (325 mg) by mouth 2 times daily                                oxyCODONE-acetaminophen 5-325 MG per tablet   Commonly known as:  PERCOCET   Take 1-2 tablets by mouth every 6 hours as needed for moderate to severe pain                                prenatal multivitamin  plus iron 27-0.8 MG Tabs per tablet   Take 1 tablet by mouth daily                                TYLENOL PO   Take 500 mg by mouth every 4 hours as needed for mild pain or fever                                          More Information        Kick Counts  It s normal to worry about your baby s health. One way you can know your baby s doing well is to record the baby s movements once a day. This is called a kick count. You will usually feel your baby move by the 20th week of pregnancy. Remember to take your kick count records to all your appointments with your healthcare provider.       How to Count Kicks    Choose a time when the baby is active, such as after a meal.     Sit comfortably or lie on your side.     The first time the baby moves, write down the time.     Count each movement until the baby has moved 10 times. This can take from 20 minutes to 2 hours.     If the baby hasn t moved 4 times in 1 hour, pat your stomach to wake the baby up.    Write down the time you feel the baby s 10th movement.    Try to do it at the same time each day.  When to Call Your Healthcare Provider  Call your healthcare provider right away if you notice any of the following:    Your baby moves fewer than 10 times in 2 hours while you re doing kick counts.    Your baby moves much less often than on the days before.    You have not felt your baby move all day.    7207-1592 [facility], [address] [place] [address], [place] [address]. All rights reserved. This information is not intended as a  substitute for professional medical care. Always follow your healthcare professional's instructions.

## 2017-05-19 NOTE — PLAN OF CARE
Data: Patient presented to the [facility] at 0913.   Reason for maternal/fetal assessment per patient is Abdominal Pain  . Patient is a . Prenatal record reviewed.      Obstetric History       T0      TAB0   SAB0   E0   M0   L0       # Outcome Date GA Lbr Aiden/2nd Weight Sex Delivery Anes PTL Lv   1 Current                  Medical History:   Past Medical History:   Diagnosis Date     Migraine without status migrainosus, not intractable     Migraine     Mild intermittent asthma, uncomplicated     Asthma   . Gestational Age 33w2d. VSS. Cervix: not examined.  Fetal movement present. Patient presents to [facility] with URQ pain since Monday.Patient denies cramping, backache, vaginal discharge, pelvic pressure, UTI symptoms, GI problems, bloody show, vaginal bleeding, edema, headache, visual disturbances, epigastric or rupture of membranes. Support persons not present.  Action: Verbal consent for EFM. Triage assessment completed. EFM applied for  pregnancy. Uterine assessment no contractions. Fetal assessment: Presumed adequate fetal oxygenation documented (see flow record). Patient education pamphlets given on fetal movement counts and pre eclampsia signs and labor signs. Patient instructed to report change in fetal movement, vaginal leaking of fluid or bleeding, abdominal pain, or any concerns related to the pregnancy to her nurse/physician.   Response: Mag Espinosa CNM informed of arrival and has seen patient. Plan per provider is discharge home with pain medications.  F/U on Monday for ultrasound of abdomen. Patient verbalized understanding of education and verbalized agreement with plan. Discharged ambulatory at 1115.

## 2017-05-19 NOTE — PROVIDER NOTIFICATION
05/19/17 0935   Provider Notification   Provider Name/Title Mag Espinosa CNM   Method of Notification In Department   Request Evaluate in Person   Notification Reason Patient Arrived;Pain

## 2017-05-19 NOTE — DISCHARGE INSTRUCTIONS
Discharge Instruction for Undelivered Patients      You were seen for: abdominal pain.  You were seen by Mag Espinosa CNM.  You had (Test or Medicine):blood work and baby monitoring.     Diet:   Drink 8 to 12 glasses of liquids (milk, juice, water) every day.  You may eat meals and snacks.     Activity:  Count fetal kicks everyday (see handout)  Call your doctor or nurse midwife if your baby is moving less than usual.     Call your provider if you notice:  Swelling in your face or increased swelling in your hands or legs.  Headaches that are not relieved by Tylenol (acetaminophen).  Changes in your vision (blurring: seeing spots or stars.)  Nausea (sick to your stomach) and vomiting (throwing up).   Weight gain of 5 pounds or more per week.  Heartburn that doesn't go away.  Signs of bladder infection: pain when you urinate (use the toilet), need to go more often and more urgently.  The bag of merida (rupture of membranes) breaks, or you notice leaking in your underwear.  Bright red blood in your underwear.  Abdominal (lower belly) or stomach pain.  For first baby: Contractions (tightening) less than 5 minutes apart for one hour or more.  If less than 34 weeks: Contractions (tightenings) more than 6 times in one hour.  Increase or change in vaginal discharge (note the color and amount)  Other: Do not eat or drink anything for 8 hours before your procedure on Monday, starting at midnight on Sunday 5/21/17.    Follow-up:  As scheduled in the clinic for OB and Monday, May 22nd at 0800 for ultrasound of abdomen.

## 2017-05-19 NOTE — PROGRESS NOTES
HOSPITAL TRIAGE NOTE  ===================    CHIEF COMPLAINT  ========================  Julieth Connelly is a 22 year old patient presenting today at 33w2d for evaluation of RUQ pain.    Patient's LMP from OB Dating Form was 9/10/2016.  Estimated Date of Delivery: 2017     HPI  ==================   Patient is here this morning with c/o increasing RUQ pain and severe nausea. She was seen in clinic yesterday and it has been worsening since then. She threw up this morning and then had some eggs and toast afterwards. She reports that the pain is worse with activity or walking, does not notice that it is worse after eating. Initial c/o of RUQ pain with HA - BP WNL and labs all normal yesterday. No uterine contractions or LOF or bleeding. Baby is active.   CONTRACTIONS: none  ABDOMINAL PAIN: sharp and RUQ - rates the pain at a 8.5/10 and says constant but when asked if it has times when worse or better she says that when lying down she doesn't have any pain (like during interview).   FETAL MOVEMENT: active    VAGINAL BLEEDING: none  RUPTURE OF MEMBRANES: no  PELVIC PAIN: none  OTHER: low back pain when walking    REVIEW OF SYSTEMS  =====================  C: NEGATIVE for fever, chills  I: NEGATIVE for worrisome rashes, moles or lesions  E: NEGATIVE for vision changes or irritation  R: NEGATIVE for significant cough or SOB  CV: NEGATIVE for chest pain, palpitations or varicosities  GI: NEGATIVE for nausea, abdominal pain, heartburn, or change in bowel habits  : NEGATIVE for frequency, dysuria, or hematuria  M: NEGATIVE for significant arthralgias or myalgia  N: NEGATIVE for headache, weakness, dizziness or paresthesias  P: NEGATIVE for changes in mood or affect    HISTORIES  ==============  ALLERGIES:    No Known Allergies  PAST MEDICAL HISTORY  Past Medical History:   Diagnosis Date     Migraine without status migrainosus, not intractable     Migraine     Mild intermittent asthma, uncomplicated     Asthma      PARTNER: not present  FAMILY HISTORY  Family History   Problem Relation Age of Onset     Coronary Artery Disease Maternal Grandmother      Hypertension Maternal Grandmother      Hypertension Mother      Anemia Mother      DIABETES Father      DIABETES Paternal Grandfather      Other Cancer Paternal Grandfather      DIABETES Paternal Uncle      OB HISTORY  Obstetric History       T0      TAB0   SAB0   E0   M0   L0       # Outcome Date GA Lbr Aiden/2nd Weight Sex Delivery Anes PTL Lv   1 Current                   EXAM  ============  /81  Temp 98.5  F (36.9  C) (Oral)  GENERAL APPEARANCE: healthy, alert and no distress  RESP: lungs clear to auscultation - no rales, rhonchi or wheezes  CV: regular rates and rhythm, normal S1 S2, no S3 or S4 and no murmur,and no varicosities  ABDOMEN:  soft, nontender,non-tender between contractions no epigastric pain  NEURO: Denies headache, blurred vision  PSYCH: mentation appears normal. and affect normal/bright  LEGS: Reflexes normal bilaterally, one beat of clonus bilateral lower extremities    CONTRACTIONS:  Rare  not felt by patient  FETAL HEART TONES:  135 with moderate variability, accelerations-yes, decels none.  NST: REACTIVE    PELVIC EXAM:deferred  HYDE SCORE: na  PRESENTATION: uncertain - FHTs auscultated above U  BLOOD: no  DISCHARGE: none    LABS:  UA, WET PREP, GC/ CHLAMYDIA and CBC, BMP    DIAGNOSIS  ============  33w2d  RUQ pain  NST: REACTIVE  Fetal Heart rate tracing: category one    PLAN  ============  Home with PTL instructions per discharge instruction form  Ultrasound scheduled for Monday morning as it is better done if patient is NPO x8 hours  Medications given - Percocet 5/325 PO 1-2 tablets q6 hours #24. Given enough to get through the weekend.   Has f/u appointment in clinic on  - encouraged to make one sooner or return to [facility] if pain is worsening or symptoms changing. Patient agrees to plan and verbalizes understanding.    Mag Espinosa, FILIPE Espinosa, MURALI DENT

## 2017-05-19 NOTE — LETTER
[facility]  [address]  [place] [address] [phone]        May 22, 2017      Julieth Connelly  [address] [facility] [place] [address]          Dear Ms. Connelly,    The results of your recent lab tests were within normal limits. Enclosed is a copy of these results.  If you have any further questions or problems, please contact our office at [phone].  Component      Latest Ref Rng & Units 5/19/2017 5/19/2017 5/19/2017           9:52 AM 10:25 AM 10:25 AM   Color Urine        Light Yellow    Appearance Urine        Clear    Glucose Urine      NEG mg/dL  Negative    Bilirubin Urine      NEG  Negative    Ketones Urine      NEG mg/dL  Negative    Specific Gravity Urine      1.003 - 1.035  1.006    Blood Urine      NEG  Negative    pH Urine      5.0 - 7.0 pH  7.0    Protein Albumin Urine      NEG mg/dL  Negative    Urobilinogen mg/dL      0.0 - 2.0 mg/dL  Normal    Nitrite Urine      NEG  Negative    Leukocyte Esterase Urine      NEG  Negative    Source        Midstream Urine    WBC Urine      0 - 2 /HPF  1    RBC Urine      0 - 2 /HPF  1    Bacteria Urine      NEG /HPF  Few (A)    Squamous Epithelial /HPF Urine      0 - 1 /HPF  <1    Sodium      133 - 144 mmol/L 141     Potassium      3.4 - 5.3 mmol/L 3.9     Chloride      94 - 109 mmol/L 108     Carbon Dioxide      20 - 32 mmol/L 24     Anion Gap      3 - 14 mmol/L 9     Glucose      70 - 99 mg/dL 81     Urea Nitrogen      7 - 30 mg/dL 5 (L)     Creatinine      0.52 - 1.04 mg/dL 0.54     GFR Estimate      >60 mL/min/1.7m2 >90 . . .     GFR Estimate If Black      >60 mL/min/1.7m2 >90 . . .     Calcium      8.5 - 10.1 mg/dL 8.4 (L)     WBC      4.0 - 11.0 10e9/L 8.6     RBC Count      3.8 - 5.2 10e12/L 3.96     Hemoglobin      11.7 - 15.7 g/dL 10.2 (L)     Hematocrit      35.0 - 47.0 % 31.5 (L)     MCV      78 - 100 fl 80     MCH      26.5 - 33.0 pg 25.8 (L)     MCHC      31.5 - 36.5 g/dL 32.4     RDW      10.0 - 15.0 % 13.8     Platelet Count      150 -  450 10e9/L 266     Specimen Description        Vagina Vagina   Wet Prep        Moderate PMNs seen . . .    Micro Report Status        FINAL 05/19/2017    Chlamydia Trachomatis PCR      NEG   Negative . . .   Specimen Descrip        Vagina    N Gonorrhea PCR      NEG  Negative . . .    AST      0 - 45 U/L 17     ALT      0 - 50 U/L 17       Sincerely,        Mag Espinosa CNM/ty

## 2017-05-19 NOTE — TELEPHONE ENCOUNTER
"Call Type: Triage Call    Presenting Problem: FNA called back  Patient.  Patient said she \"  is  going to call back pager number for Midwife ARTUR Espinosa now . \"  Triage Note:  Guideline Title: No Guideline - Advice Per Reference (Adult)  Recommended Disposition: Call Provider Immediately  Original Inclination: Did not know what to do  Override Disposition:  Intended Action: Call PCP/HCP  Physician Contacted: No  CALL PROVIDER IMMEDIATELY ?  YES  SEE ED IMMEDIATELY ? NO  ACTIVATE  ? NO  Physician Instructions:  Care Advice:  "

## 2017-05-19 NOTE — TELEPHONE ENCOUNTER
"Paged at 0720 with c/o from patient that she was having ongoing and severe right sided pain. She was seen in clinic yesterday for this concern and was worked up for preeclampsia because she was also c/o headaches. All labs were WNL. Page this morning said \"unbearable worsening R abd pain and severe nausea\". She was instructed to go to ED but said she wanted to wait for the CNM to call her back. I returned the call at 0730 and patient did not answer. Left a message that she should be seen and left my personal pager number for her to contact me. Called again at 0800 and left another message with contact number for patient to call. Mag Espinosa CNM  "

## 2017-05-21 LAB
C TRACH DNA SPEC QL NAA+PROBE: NORMAL
N GONORRHOEA DNA SPEC QL NAA+PROBE: NORMAL
SPECIMEN SOURCE: NORMAL
SPECIMEN SOURCE: NORMAL

## 2017-05-22 ENCOUNTER — TELEPHONE (OUTPATIENT)
Dept: OBGYN | Facility: CLINIC | Age: 22
End: 2017-05-22

## 2017-05-22 ENCOUNTER — HOSPITAL ENCOUNTER (OUTPATIENT)
Dept: ULTRASOUND IMAGING | Facility: CLINIC | Age: 22
Discharge: HOME | End: 2017-05-22
Attending: ADVANCED PRACTICE MIDWIFE | Admitting: ADVANCED PRACTICE MIDWIFE
Payer: COMMERCIAL

## 2017-05-22 DIAGNOSIS — R10.11 RUQ ABDOMINAL PAIN: ICD-10-CM

## 2017-05-22 LAB — RADIOLOGIST FLAGS: ABNORMAL

## 2017-05-22 PROCEDURE — 76705 ECHO EXAM OF ABDOMEN: CPT

## 2017-05-22 NOTE — TELEPHONE ENCOUNTER
Call to patient. Preliminary US given to RN found no kidney stones.  Julieth says that pain is continuing. She sounds comfortable on phone.  There is noise in the background and she says that she is out for breakfast.  She has appt on 5/24.  Advised to call or come in if pain is worse.

## 2017-05-24 ENCOUNTER — PRENATAL OFFICE VISIT (OUTPATIENT)
Dept: OBGYN | Facility: CLINIC | Age: 22
End: 2017-05-24
Payer: COMMERCIAL

## 2017-05-24 VITALS — SYSTOLIC BLOOD PRESSURE: 116 MMHG | BODY MASS INDEX: 37.04 KG/M2 | DIASTOLIC BLOOD PRESSURE: 70 MMHG | WEIGHT: 226 LBS

## 2017-05-24 DIAGNOSIS — Z34.03 ENCOUNTER FOR SUPERVISION OF NORMAL FIRST PREGNANCY IN THIRD TRIMESTER: Primary | ICD-10-CM

## 2017-05-24 PROCEDURE — 99207 ZZC PRENATAL VISIT: CPT | Performed by: ADVANCED PRACTICE MIDWIFE

## 2017-05-24 NOTE — MR AVS SNAPSHOT
"              After Visit Summary   2017    Julieth Connelly    MRN: [id number]           Patient Information     Date Of Birth          1995        Visit Information        Provider Department      2017 9:15 AM Rigo Beasley APRN [place] [facility]        Today's Diagnoses     Encounter for supervision of normal first pregnancy in third trimester    -  1       Follow-ups after your visit        Who to contact     If you have questions or need follow up information about today's clinic visit or your schedule please contact [facility] directly at [phone].  Normal or non-critical lab and imaging results will be communicated to you by Pheedohart, letter or phone within 4 business days after the clinic has received the results. If you do not hear from us within 7 days, please contact the clinic through Pheedohart or phone. If you have a critical or abnormal lab result, we will notify you by phone as soon as possible.  Submit refill requests through Oas[employer] Mobile or call your pharmacy and they will forward the refill request to us. Please allow 3 business days for your refill to be completed.          Additional Information About Your Visit        MyChart Information     [employer] Mobile lets you send messages to your doctor, view your test results, renew your prescriptions, schedule appointments and more. To sign up, go to [email or web address] . Click on \"Log in\" on the left side of the screen, which will take you to the Welcome page. Then click on \"Sign up Now\" on the right side of the page.     You will be asked to enter the access code listed below, as well as some personal information. Please follow the directions to create your username and password.     Your access code is: [email or web address]  Expires: 2017  2:51 PM     Your access code will  in 90 days. If you need help or a new code, please call your [facility] or [phone].        Care EveryWhere ID     This is your " Care EveryWhere ID. This could be used by other organizations to access your [place] medical records  [id number]        Your Vitals Were     BMI (Body Mass Index)                   37.04 kg/m2            Blood Pressure from Last 3 Encounters:   05/24/17 116/70   05/19/17 117/81   05/18/17 129/83    Weight from Last 3 Encounters:   05/24/17 226 lb (102.5 kg)   05/18/17 223 lb (101.2 kg)   05/10/17 223 lb (101.2 kg)              Today, you had the following     No orders found for display       Primary Care Provider Office Phone # Fax #    Alethea Schmid [phone]       [address]   [place] [facility] [address]        Thank you!     Thank you for choosing [facility] [place] [facility]  for your care. Our goal is always to provide you with excellent care. Hearing back from our patients is one way we can continue to improve our services. Please take a few minutes to complete the written survey that you may receive in the mail after your visit with us. Thank you!             Your Updated Medication List - Protect others around you: Learn how to safely use, store and throw away your medicines at www.disposemymeds.org.          This list is accurate as of: 5/24/17  2:51 PM.  Always use your most recent med list.                   Brand Name Dispense Instructions for use    albuterol 108 (90 BASE) MCG/ACT Inhaler    PROAIR HFA/PROVENTIL HFA/VENTOLIN HFA    3 Inhaler    Inhale 2 puffs into the lungs every 6 hours as needed for shortness of breath / dyspnea or wheezing Reported on 3/15/2017       docusate sodium 100 MG tablet    COLACE    60 tablet    Take 100 mg by mouth daily       ferrous sulfate 325 (65 FE) MG tablet    IRON    100 tablet    Take 1 tablet (325 mg) by mouth 2 times daily       oxyCODONE-acetaminophen 5-325 MG per tablet    PERCOCET    24 tablet    Take 1-2 tablets by mouth every 6 hours as needed for moderate to severe pain       prenatal multivitamin  plus iron  27-0.8 MG Tabs per tablet     100 tablet    Take 1 tablet by mouth daily       TYLENOL PO      Take 500 mg by mouth every 4 hours as needed for mild pain or fever

## 2017-05-24 NOTE — PROGRESS NOTES
Seen in [facility] for RUQ pain that now seems resolved.  Took some narcotics to help her sleep with discomfort.  Denies constipation.   Still having headaches on frequent basis but had them even worse pre pregnancy so not new event.  Was seen by Neurologist in past and treated with muscle relaxer as tension headache source.  ASSESSMENT: 34w0d   PLAN: RTC in 2 wks.  Reviewed labs from hospital visit.   NIEVES

## 2017-06-07 ENCOUNTER — PRENATAL OFFICE VISIT (OUTPATIENT)
Dept: OBGYN | Facility: CLINIC | Age: 22
End: 2017-06-07
Payer: COMMERCIAL

## 2017-06-07 VITALS — WEIGHT: 227 LBS | SYSTOLIC BLOOD PRESSURE: 122 MMHG | BODY MASS INDEX: 37.2 KG/M2 | DIASTOLIC BLOOD PRESSURE: 74 MMHG

## 2017-06-07 DIAGNOSIS — Z34.93 SUPERVISION OF LOW-RISK PREGNANCY, THIRD TRIMESTER: Primary | ICD-10-CM

## 2017-06-07 DIAGNOSIS — D50.8 IRON DEFICIENCY ANEMIA SECONDARY TO INADEQUATE DIETARY IRON INTAKE: ICD-10-CM

## 2017-06-07 LAB — HEMOGLOBIN: 9.9 G/DL (ref 11.7–15.7)

## 2017-06-07 PROCEDURE — 87653 STREP B DNA AMP PROBE: CPT | Performed by: ADVANCED PRACTICE MIDWIFE

## 2017-06-07 PROCEDURE — 00000218 HCL OB HEMOGLOBIN (NS/SS): Performed by: ADVANCED PRACTICE MIDWIFE

## 2017-06-07 PROCEDURE — 36415 COLL VENOUS BLD VENIPUNCTURE: CPT | Performed by: ADVANCED PRACTICE MIDWIFE

## 2017-06-07 PROCEDURE — 87491 CHLMYD TRACH DNA AMP PROBE: CPT | Performed by: ADVANCED PRACTICE MIDWIFE

## 2017-06-07 PROCEDURE — 86803 HEPATITIS C AB TEST: CPT | Performed by: ADVANCED PRACTICE MIDWIFE

## 2017-06-07 PROCEDURE — 99207 ZZC PRENATAL VISIT: CPT | Performed by: ADVANCED PRACTICE MIDWIFE

## 2017-06-07 PROCEDURE — 87591 N.GONORRHOEAE DNA AMP PROB: CPT | Performed by: ADVANCED PRACTICE MIDWIFE

## 2017-06-07 NOTE — PROGRESS NOTES
3rd Trimester Education:  Risk/ benefits of pain medication options reviewed? YES  Birth plan reviewed? N/A     Discussed when to call in labor.  Missy   [employer]    Pediatrician considered? [facility] in [place]                  Circumcision? NO        Birth Spacing Discussion YES       Contraceptive Options Handout provided? no  Birth control options reviewed.  Planning: Depo-Provera and   Future Pregnancy Planning: 3 years       Breastfeeding Plan? Yes    FOB is not involved initially but becoming more so over last month but they are not together as a couple.  He will not be in labor with her.  Plan is mother and  for support.  Reviewed On Info option with her also.  Considering and sign up for water birth option with Hep C today.  R side pain resolved with out incident.  Works in .  ASSESSMENT: 36w0d  PLAN: Will not restart on Fe as pt was not taking due to constipation issues and Hgb is 9.9.  Daily PNV.   JE

## 2017-06-07 NOTE — MR AVS SNAPSHOT
"              After Visit Summary   2017    Julieth Connelly    MRN: [id number]           Patient Information     Date Of Birth          1995        Visit Information        Provider Department      2017 10:30 AM Rigo Beasley APRN CNM [facility] [place]        Today's Diagnoses     Supervision of low-risk pregnancy, third trimester    -  1    Iron deficiency anemia secondary to inadequate dietary iron intake           Follow-ups after your visit        Who to contact     If you have questions or need follow up information about today's clinic visit or your schedule please contact [facility] directly at [phone].  Normal or non-critical lab and imaging results will be communicated to you by IBUonlinehart, letter or phone within 4 business days after the clinic has received the results. If you do not hear from us within 7 days, please contact the clinic through IBUonlinehart or phone. If you have a critical or abnormal lab result, we will notify you by phone as soon as possible.  Submit refill requests through CrowdBouncer or call your pharmacy and they will forward the refill request to us. Please allow 3 business days for your refill to be completed.          Additional Information About Your Visit        MyChart Information     CrowdB[employer]cer lets you send messages to your doctor, view your test results, renew your prescriptions, schedule appointments and more. To sign up, go to [email or web address] . Click on \"Log in\" on the left side of the screen, which will take you to the Welcome page. Then click on \"Sign up Now\" on the right side of the page.     You will be asked to enter the access code listed below, as well as some personal information. Please follow the directions to create your username and password.     Your access code is: [email or web address]  Expires: 2017  2:51 PM     Your access code will  in 90 days. If you need help or a new code, please call your [facility] or " [phone].        Care EveryWhere ID     This is your Care EveryWhere ID. This could be used by other organizations to access your [place] medical records  [id number]        Your Vitals Were     BMI (Body Mass Index)                   37.2 kg/m2            Blood Pressure from Last 3 Encounters:   06/07/17 122/74   05/24/17 116/70   05/19/17 117/81    Weight from Last 3 Encounters:   06/07/17 227 lb (103 kg)   05/24/17 226 lb (102.5 kg)   05/18/17 223 lb (101.2 kg)              We Performed the Following     CHLAMYDIA TRACHOMATIS PCR     Group B strep PCR     Hepatitis C antibody     NEISSERIA GONORRHOEA PCR     OB HEMOGLOBIN (NS/SS)          Today's Medication Changes          These changes are accurate as of: 6/7/17  1:32 PM.  If you have any questions, ask your nurse or doctor.               Stop taking these medicines if you haven't already. Please contact your care team if you have questions.     docusate sodium 100 MG tablet   Commonly known as:  COLACE           ferrous sulfate 325 (65 FE) MG tablet   Commonly known as:  IRON           oxyCODONE-acetaminophen 5-325 MG per tablet   Commonly known as:  PERCOCET                    Primary Care Provider Office Phone # Fax #    Alethea Schmid [phone]       [address]   [place] [facility] [address]        Thank you!     Thank you for choosing [facility]  for your care. Our goal is always to provide you with excellent care. Hearing back from our patients is one way we can continue to improve our services. Please take a few minutes to complete the written survey that you may receive in the mail after your visit with us. Thank you!             Your Updated Medication List - Protect others around you: Learn how to safely use, store and throw away your medicines at www.disposemymeds.org.          This list is accurate as of: 6/7/17  1:32 PM.  Always use your most recent med list.                   Brand Name  Dispense Instructions for use    albuterol 108 (90 BASE) MCG/ACT Inhaler    PROAIR HFA/PROVENTIL HFA/VENTOLIN HFA    3 Inhaler    Inhale 2 puffs into the lungs every 6 hours as needed for shortness of breath / dyspnea or wheezing Reported on 3/15/2017       prenatal multivitamin  plus iron 27-0.8 MG Tabs per tablet     100 tablet    Take 1 tablet by mouth daily       TYLENOL PO      Take 500 mg by mouth every 4 hours as needed for mild pain or fever

## 2017-06-08 LAB — HCV AB SERPL QL IA: NORMAL

## 2017-06-08 ASSESSMENT — PATIENT HEALTH QUESTIONNAIRE - PHQ9: SUM OF ALL RESPONSES TO PHQ QUESTIONS 1-9: 6

## 2017-06-09 ENCOUNTER — TELEPHONE (OUTPATIENT)
Dept: MIDWIFE SERVICES | Facility: CLINIC | Age: 22
End: 2017-06-09

## 2017-06-09 LAB
C TRACH DNA SPEC QL NAA+PROBE: NORMAL
GP B STREP DNA SPEC QL NAA+PROBE: NORMAL
N GONORRHOEA DNA SPEC QL NAA+PROBE: NORMAL
SPECIMEN SOURCE: NORMAL

## 2017-06-09 NOTE — TELEPHONE ENCOUNTER
Pt called in stating that she had some bleeding on the toilet paper when she wiped. She states that she has been having some mild cramping since yesterday as well as some tightening. Pt has +FM. Pt is 36w2d, first baby. Should the pt come in to be seen in Triage? Would you like to call and speak with her? Please Advise what you would recommend. Elisha Walker RN

## 2017-06-09 NOTE — TELEPHONE ENCOUNTER
TC to the pt. Pt aware and agreeable to monitor for now. Pt informed that she can call [phone] if needed, even after hours for advice. Pt given early labor instructions and s/sx to monitor for. Elisha Walker RN

## 2017-06-09 NOTE — TELEPHONE ENCOUNTER
Appears to be latent/early labor.  Amount of or presence of this small amount of bleeding is not significant for concern.  Would have her monitor ctx but expect they will resolve.  Usual instructions for early labor of primigravida.   Clary.  NIEVES

## 2017-06-13 ENCOUNTER — NURSE TRIAGE (OUTPATIENT)
Dept: NURSING | Facility: CLINIC | Age: 22
End: 2017-06-13

## 2017-06-13 ENCOUNTER — PRENATAL OFFICE VISIT (OUTPATIENT)
Dept: MIDWIFE SERVICES | Facility: CLINIC | Age: 22
End: 2017-06-13
Payer: COMMERCIAL

## 2017-06-13 ENCOUNTER — TELEPHONE (OUTPATIENT)
Dept: MIDWIFE SERVICES | Facility: CLINIC | Age: 22
End: 2017-06-13

## 2017-06-13 ENCOUNTER — TELEPHONE (OUTPATIENT)
Dept: NURSING | Facility: CLINIC | Age: 22
End: 2017-06-13

## 2017-06-13 VITALS
WEIGHT: 231 LBS | HEART RATE: 91 BPM | SYSTOLIC BLOOD PRESSURE: 124 MMHG | OXYGEN SATURATION: 100 % | BODY MASS INDEX: 37.86 KG/M2 | DIASTOLIC BLOOD PRESSURE: 76 MMHG

## 2017-06-13 DIAGNOSIS — G44.219 EPISODIC TENSION-TYPE HEADACHE, NOT INTRACTABLE: Primary | ICD-10-CM

## 2017-06-13 DIAGNOSIS — Z34.02 ENCOUNTER FOR SUPERVISION OF NORMAL FIRST PREGNANCY IN SECOND TRIMESTER: ICD-10-CM

## 2017-06-13 PROCEDURE — 99207 ZZC PRENATAL VISIT: CPT | Performed by: ADVANCED PRACTICE MIDWIFE

## 2017-06-13 RX ORDER — OXYCODONE AND ACETAMINOPHEN 5; 325 MG/1; MG/1
1-2 TABLET ORAL EVERY 6 HOURS PRN
Qty: 10 TABLET | Refills: 0 | Status: ON HOLD | OUTPATIENT
Start: 2017-06-13 | End: 2017-06-26

## 2017-06-13 NOTE — TELEPHONE ENCOUNTER
----- Message from MURALI Hoyos CNM sent at 6/13/2017  7:57 AM CDT -----  Regarding: please schedule appt for today  Hello,    This patient has at a HA for 2 days that won't go away. She needs to have an appointment to check her BP. I do not currently have anyone in labor so you can put her on my schedule anytime today or schedule her with the clinic CNM if there is an opening. Whichever is fine with me. Can you call her and schedule?  BTW I had a hard time calling out with the phone in the call room and had to use my cell. Not sure if the phone system is still down?!    -Kala

## 2017-06-13 NOTE — TELEPHONE ENCOUNTER
Clinic Action Needed: Page  Wilman tabares Midwife Kala GARCIA to Patient cell phone [phone] @740am to discuss swelling extremities and headache and pain behind right eye for the last 48 hours.  Don't seen anything on Epic about follow up , resending message . Unable to contact Patient but FNA unsure of due to phone problem. .Krysten Yousif nurse advisors.    FNA Triage Call  Presenting Problem: GLEN 7/5/17 - seen midwife Rigo Beasley , severe  headache and worsen swelling right > Left  ankle and foot , and hands and  stronger than menstrual cramps  & Pain behind right eye  For last 48 hours .   Guideline Used:pregnancy complications .   Patient Recommendations/Teaching:paged on-call midwife .   Routed to:Sent to PCP's nurse pool.   Krysten Andersen RN, [facility]

## 2017-06-13 NOTE — TELEPHONE ENCOUNTER
Yes. I will see her at 11. I have a patient in L& D but i believe I can work it in.    Kala Verma CNM

## 2017-06-13 NOTE — TELEPHONE ENCOUNTER
GLEN 7/5/17 followed by [place] [facility]wife Rigo Beasley at [facility] . Having Headache , increase extremity swelling   for the last 48 hours. Wants to speak to midwife for directions . Page  Wilman  @ 732 am   paged midwife Kala Verma   to Patient phone  [phone] . Krysten Andersen RN [place] nurse advisors.      Reason for Disposition    Severe pain in one eye    Additional Information    Negative: Difficult to awaken or acting confused  (e.g., disoriented, slurred speech)    Negative: [1] Weakness of the face, arm or leg on one side of the body AND [2] new onset    Negative: [1] Numbness of the face, arm or leg on one side of the body AND [2] new onset    Negative: [1] Loss of speech or garbled speech AND [2] new onset    Negative: Sounds like a life-threatening emergency to the triager    Negative: Followed a head injury within last 3 days    Negative: Unable to walk, or can only walk with assistance (e.g., requires support)    Negative: Stiff neck (can't touch chin to chest)    Negative: [1] Other family members (or roommates) with headaches AND [2] possibility of carbon monoxide exposure    Protocols used: PREGNANCY - HEADACHE-ADULT-AH

## 2017-06-13 NOTE — MR AVS SNAPSHOT
"              After Visit Summary   6/13/2017    Julieth Connelly    MRN: [id number]           Patient Information     Date Of Birth          1995        Visit Information        Provider Department      6/13/2017 11:00 AM Kala Verma APRN CNM [facility]        Today's Diagnoses     Episodic tension-type headache, not intractable    -  1    Encounter for supervision of normal first pregnancy in third trimester           Follow-ups after your visit        Your next 10 appointments already scheduled     Jun 14, 2017 10:00 AM ALANT   ESTABLISHED PRENATAL with Jill Gonzalez CNM   [facility] ([facility])    [address] [place] [address] [phone]              Who to contact     If you have questions or need follow up information about today's clinic visit or your schedule please contact [facility] directly at [phone].  Normal or non-critical lab and imaging results will be communicated to you by MyChart, letter or phone within 4 business days after the clinic has received the results. If you do not hear from us within 7 days, please contact the clinic through MyChart or phone. If you have a critical or abnormal lab result, we will notify you by phone as soon as possible.  Submit refill requests through [employer] or call your pharmacy and they will forward the refill request to us. Please allow 3 business days for your refill to be completed.          Additional Information About Your Visit        Mapidyhart Information     [employer] lets you send messages to your doctor, view your test results, renew your prescriptions, schedule appointments and more. To sign up, go to [email or web address] . Click on \"Log in\" on the left side of the screen, which will take you to the Welcome page. Then click on \"Sign up Now\" on the right side of the page.     You will be asked to enter the access code listed " below, as well as some personal information. Please follow the directions to create your username and password.     Your access code is: [email or web address]  Expires: 2017  2:51 PM     Your access code will  in 90 days. If you need help or a new code, please call your [facility] or [phone].        Care EveryWhere ID     This is your Care EveryWhere ID. This could be used by other organizations to access your [facility] medical records  [id number]        Your Vitals Were     Pulse Pulse Oximetry Breastfeeding? BMI (Body Mass Index)          91 100% No 37.86 kg/m2         Blood Pressure from Last 3 Encounters:   17 124/76   17 122/74   17 116/70    Weight from Last 3 Encounters:   17 231 lb (104.8 kg)   17 227 lb (103 kg)   17 226 lb (102.5 kg)              Today, you had the following     No orders found for display         Today's Medication Changes          These changes are accurate as of: 17 11:35 AM.  If you have any questions, ask your nurse or doctor.               Start taking these medicines.        Dose/Directions    oxyCODONE-acetaminophen 5-325 MG per tablet   Commonly known as:  PERCOCET   Used for:  Episodic tension-type headache, not intractable   Started by:  Kala Verma APRN CNM        Dose:  1-2 tablet   Take 1-2 tablets by mouth every 6 hours as needed for pain maximum 8 tablet(s) per day   Quantity:  10 tablet   Refills:  0            Where to get your medicines      Some of these will need a paper prescription and others can be bought over the counter.  Ask your nurse if you have questions.     Bring a paper prescription for each of these medications     oxyCODONE-acetaminophen 5-325 MG per tablet                Primary Care Provider Office Phone # Fax #    Alethea Schmid [phone]       [address]   [place] [facility] [address]        Thank you!     Thank you for choosing [facility]  [place]  for your care. Our goal is always to provide you with excellent care. Hearing back from our patients is one way we can continue to improve our services. Please take a few minutes to complete the written survey that you may receive in the mail after your visit with us. Thank you!             Your Updated Medication List - Protect others around you: Learn how to safely use, store and throw away your medicines at www.disposemymeds.org.          This list is accurate as of: 6/13/17 11:35 AM.  Always use your most recent med list.                   Brand Name Dispense Instructions for use    albuterol 108 (90 BASE) MCG/ACT Inhaler    PROAIR HFA/PROVENTIL HFA/VENTOLIN HFA    3 Inhaler    Inhale 2 puffs into the lungs every 6 hours as needed for shortness of breath / dyspnea or wheezing Reported on 3/15/2017       oxyCODONE-acetaminophen 5-325 MG per tablet    PERCOCET    10 tablet    Take 1-2 tablets by mouth every 6 hours as needed for pain maximum 8 tablet(s) per day       prenatal multivitamin  plus iron 27-0.8 MG Tabs per tablet     100 tablet    Take 1 tablet by mouth daily       TYLENOL PO      Take 500 mg by mouth every 4 hours as needed for mild pain or fever

## 2017-06-13 NOTE — PROGRESS NOTES
36w6d  Acute visit for migraine HA lasting 2 days, behind eye. BP normal, no visual disturbances, no RUQ pain. Hx of migraines. Took tylenol to no avail. Very very small prescription given for percocet x 1 day. Will rtc if migraine does not go away by tomorrw. Recommend rest and hydration. Reports good fetal movement. Denies leaking of fluid, vaginal bleeding, regular uterine contractions,  or other concerns.  [facility]

## 2017-06-14 ENCOUNTER — PRENATAL OFFICE VISIT (OUTPATIENT)
Dept: OBGYN | Facility: CLINIC | Age: 22
End: 2017-06-14
Payer: COMMERCIAL

## 2017-06-14 VITALS — BODY MASS INDEX: 37.53 KG/M2 | DIASTOLIC BLOOD PRESSURE: 72 MMHG | SYSTOLIC BLOOD PRESSURE: 110 MMHG | WEIGHT: 229 LBS

## 2017-06-14 DIAGNOSIS — Z34.02 ENCOUNTER FOR SUPERVISION OF NORMAL FIRST PREGNANCY IN SECOND TRIMESTER: ICD-10-CM

## 2017-06-14 DIAGNOSIS — D50.8 IRON DEFICIENCY ANEMIA SECONDARY TO INADEQUATE DIETARY IRON INTAKE: Primary | ICD-10-CM

## 2017-06-14 PROCEDURE — 99207 ZZC PRENATAL VISIT: CPT | Performed by: ADVANCED PRACTICE MIDWIFE

## 2017-06-14 RX ORDER — FERROUS GLUCONATE 324(38)MG
324 TABLET ORAL
Qty: 60 TABLET | Refills: 0 | Status: SHIPPED | OUTPATIENT
Start: 2017-06-14

## 2017-06-14 NOTE — PROGRESS NOTES
Feeling well.  Baby is active. Denies any leaking of fluid, vaginal bleeding, regular uterine contractions, or headaches or other concerns.  FOY is better.    She has not been taking iron for anemia.  She agrees to start with 1 per day.  We discussed that the anemia could be contributing to her headache.  Says that she has been having more urine leaking.  Seems clear.  Moderate amount of white vaginal discharge noted on underpants.  Speculum exam reveals moderate white vaginal physiologic appearing discharge.  No amniotic fluid noted.   Nitrazine negative.     Preparing for birth.  Has an [employer] .    Reviewed to call [phone] for contractions, loss of fluid, vaginal bleeding, decreased fetal movement or any other questions or concerns.    RTC in 1 weeks.  Jill Gonzalez, RADHA, APRN, CNM

## 2017-06-14 NOTE — MR AVS SNAPSHOT
"              After Visit Summary   6/14/2017    Julieth Connelly    MRN: [id number]           Patient Information     Date Of Birth          1995        Visit Information        Provider Department      6/14/2017 10:00 AM Jill Gonzalez CNM [facility]        Today's Diagnoses     Iron deficiency anemia secondary to inadequate dietary iron intake    -  1    Encounter for supervision of normal first pregnancy in third trimester           Follow-ups after your visit        Follow-up notes from your care team     Return in about 1 week (around 6/21/2017) for Prenatal care with FILIPE.      Who to contact     If you have questions or need follow up information about today's clinic visit or your schedule please contact [facility] directly at [phone].  Normal or non-critical lab and imaging results will be communicated to you by Knomehart, letter or phone within 4 business days after the clinic has received the results. If you do not hear from us within 7 days, please contact the clinic through Knomehart or phone. If you have a critical or abnormal lab result, we will notify you by phone as soon as possible.  Submit refill requests through [employer] or call your pharmacy and they will forward the refill request to us. Please allow 3 business days for your refill to be completed.          Additional Information About Your Visit        KnomeharACHICA Information     [employer] lets you send messages to your doctor, view your test results, renew your prescriptions, schedule appointments and more. To sign up, go to [email or web address] . Click on \"Log in\" on the left side of the screen, which will take you to the Welcome page. Then click on \"Sign up Now\" on the right side of the page.     You will be asked to enter the access code listed below, as well as some personal information. Please follow the directions to create your username and password.     Your access code is: " [email or web address]  Expires: 2017  2:51 PM     Your access code will  in 90 days. If you need help or a new code, please call your [facility] or [phone].        Care EveryWhere ID     This is your Care EveryWhere ID. This could be used by other organizations to access your [facility] medical records  [id number]        Your Vitals Were     BMI (Body Mass Index)                   37.53 kg/m2            Blood Pressure from Last 3 Encounters:   17 110/72   17 124/76   17 122/74    Weight from Last 3 Encounters:   17 229 lb (103.9 kg)   17 231 lb (104.8 kg)   17 227 lb (103 kg)              Today, you had the following     No orders found for display         Today's Medication Changes          These changes are accurate as of: 17 11:26 AM.  If you have any questions, ask your nurse or doctor.               Start taking these medicines.        Dose/Directions    ferrous gluconate 324 (38 FE) MG tablet   Commonly known as:  FERGON   Used for:  Iron deficiency anemia secondary to inadequate dietary iron intake, Encounter for supervision of normal first pregnancy in second trimester        Dose:  324 mg   Take 1 tablet (324 mg) by mouth 3 times daily (with meals)   Quantity:  60 tablet   Refills:  0            Where to get your medicines      These medications were sent to [facility]/pharmacy #[id number] - [place], [place] - [address]  [address] [facility] [address]     Phone:  [phone]     ferrous gluconate 324 (38 FE) MG tablet                Primary Care Provider Office Phone # Fax #    Alethea Schmid [phone]       [address]   [place] [facility] [address]        Thank you!     Thank you for choosing [facility]  for your care. Our goal is always to provide you with excellent care. Hearing back from our patients is one way we can continue to improve our services. Please take a few minutes to complete  the written survey that you may receive in the mail after your visit with us. Thank you!             Your Updated Medication List - Protect others around you: Learn how to safely use, store and throw away your medicines at www.disposemymeds.org.          This list is accurate as of: 6/14/17 11:26 AM.  Always use your most recent med list.                   Brand Name Dispense Instructions for use    albuterol 108 (90 BASE) MCG/ACT Inhaler    PROAIR HFA/PROVENTIL HFA/VENTOLIN HFA    3 Inhaler    Inhale 2 puffs into the lungs every 6 hours as needed for shortness of breath / dyspnea or wheezing Reported on 3/15/2017       ferrous gluconate 324 (38 FE) MG tablet    FERGON    60 tablet    Take 1 tablet (324 mg) by mouth 3 times daily (with meals)       oxyCODONE-acetaminophen 5-325 MG per tablet    PERCOCET    10 tablet    Take 1-2 tablets by mouth every 6 hours as needed for pain maximum 8 tablet(s) per day       prenatal multivitamin  plus iron 27-0.8 MG Tabs per tablet     100 tablet    Take 1 tablet by mouth daily       TYLENOL PO      Take 500 mg by mouth every 4 hours as needed for mild pain or fever

## 2017-06-17 ENCOUNTER — NURSE TRIAGE (OUTPATIENT)
Dept: NURSING | Facility: CLINIC | Age: 22
End: 2017-06-17

## 2017-06-17 NOTE — TELEPHONE ENCOUNTER
Reason for Disposition    [1] Lower back discomfort AND [2] not relieved by rest    Additional Information    Negative: MODERATE-SEVERE abdominal pain    Negative: Contractions < 10 minutes apart for 1 hour (i.e., 6 or more contractions an hour)    Negative: [1] Contractions > 10 minutes apart AND [2] persist > 24 hours AND [3] no improvement using Care Advice    Negative: [1] Contractions AND [2] any vaginal bleeding (including: red blood, clots, spotting, or pink/brown mucous)    Negative: Vaginal bleeding    (Exception: spotting after intercourse or pelvic exam, or slight pinkish or brownish mucous discharge)    Negative: Leakage of fluid from vagina    Negative: [1] Baby moving less today (e.g., kick count < 5 in 1 hour or < 10 in 2 hours) AND [2] pregnant 23 or more weeks    Negative: No movement of baby for 8 hours    Negative: New hand or face swelling    Negative: Fever > 100.4 F (38.0 C)    Negative: Increased pressure in pelvic area    Protocols used: PREGNANCY - LABOR - -ADULT-[facility]  paged on call CNW to phone patient back [place] at 8:40am.

## 2017-06-17 NOTE — TELEPHONE ENCOUNTER
Julieth is having pressure in buttocks.  Julieth is 37 weeks pregnant and due date is July 5 2017.

## 2017-06-21 ENCOUNTER — PRENATAL OFFICE VISIT (OUTPATIENT)
Dept: OBGYN | Facility: CLINIC | Age: 22
End: 2017-06-21
Payer: COMMERCIAL

## 2017-06-21 VITALS — SYSTOLIC BLOOD PRESSURE: 136 MMHG | DIASTOLIC BLOOD PRESSURE: 78 MMHG | WEIGHT: 230 LBS | BODY MASS INDEX: 37.69 KG/M2

## 2017-06-21 DIAGNOSIS — R51.9 ACHING HEADACHE: ICD-10-CM

## 2017-06-21 DIAGNOSIS — Z34.02 ENCOUNTER FOR SUPERVISION OF NORMAL FIRST PREGNANCY IN SECOND TRIMESTER: Primary | ICD-10-CM

## 2017-06-21 PROCEDURE — 99207 ZZC PRENATAL VISIT: CPT | Performed by: ADVANCED PRACTICE MIDWIFE

## 2017-06-21 PROCEDURE — 59426 ANTEPARTUM CARE ONLY: CPT | Performed by: ADVANCED PRACTICE MIDWIFE

## 2017-06-21 NOTE — MR AVS SNAPSHOT
"              After Visit Summary   6/21/2017    Julieth Connelly    MRN: [id number]           Patient Information     Date Of Birth          1995        Visit Information        Provider Department      6/21/2017 9:30 AM Jill Gonzalez CNM [facility]        Today's Diagnoses     Encounter for supervision of normal first pregnancy in third trimester    -  1    Aching headache           Follow-ups after your visit        Follow-up notes from your care team     Return in 2 days (on 6/23/2017) for Prenatal care with FILIPE.      Your next 10 appointments already scheduled     Jun 23, 2017  2:45 PM CDT   ESTABLISHED PRENATAL with MURALI Aguirre CNM   [facility] [place]    [address] [place] [address] [phone]              Who to contact     If you have questions or need follow up information about today's clinic visit or your schedule please contact [facility] directly at [phone].  Normal or non-critical lab and imaging results will be communicated to you by Instabankhart, letter or phone within 4 business days after the clinic has received the results. If you do not hear from us within 7 days, please contact the clinic through [employer] or phone. If you have a critical or abnormal lab result, we will notify you by phone as soon as possible.  Submit refill requests through [employer] or call your pharmacy and they will forward the refill request to us. Please allow 3 business days for your refill to be completed.          Additional Information About Your Visit        InstabankharBiomeme Information     [employer] lets you send messages to your doctor, view your test results, renew your prescriptions, schedule appointments and more. To sign up, go to [email or web address] . Click on \"Log in\" on the left side of the screen, which will take you to the Welcome page. Then click on \"Sign up Now\" on the right side of " the page.     You will be asked to enter the access code listed below, as well as some personal information. Please follow the directions to create your username and password.     Your access code is: [email or web address]  Expires: 2017  2:51 PM     Your access code will  in 90 days. If you need help or a new code, please call your [facility] clinic or [phone].        Care EveryWhere ID     This is your Care EveryWhere ID. This could be used by other organizations to access your [facility] medical records  [id number]        Your Vitals Were     BMI (Body Mass Index)                   37.69 kg/m2            Blood Pressure from Last 3 Encounters:   17 136/78   17 110/72   17 124/76    Weight from Last 3 Encounters:   17 230 lb (104.3 kg)   17 229 lb (103.9 kg)   17 231 lb (104.8 kg)              Today, you had the following     No orders found for display       Primary Care Provider Office Phone # Fax #    Alethea Schmid [phone]       [address]   [place] [address]        Equal Access to Services     GEORGE ROLLE : Hadii jana fritz hadasho Soomaali, waaxda luqadaha, qaybta kaalmada adeegyada, chuck pérez. So [facility] [phone].    ATENCIÓN: Si habla español, tiene a morel disposición servicios gratuitos de asistencia lingüística. Llame al [phone].    We comply with applicable federal civil rights laws and [place] laws. We do not discriminate on the basis of race, color, national origin, age, disability sex, sexual orientation or gender identity.            Thank you!     Thank you for choosing [facility]  for your care. Our goal is always to provide you with excellent care. Hearing back from our patients is one way we can continue to improve our services. Please take a few minutes to complete the written survey that you may receive in the mail after your visit with us. Thank you!              Your Updated Medication List - Protect others around you: Learn how to safely use, store and throw away your medicines at www.disposemymeds.org.          This list is accurate as of: 6/21/17 11:00 AM.  Always use your most recent med list.                   Brand Name Dispense Instructions for use Diagnosis    albuterol 108 (90 BASE) MCG/ACT Inhaler    PROAIR HFA/PROVENTIL HFA/VENTOLIN HFA    3 Inhaler    Inhale 2 puffs into the lungs every 6 hours as needed for shortness of breath / dyspnea or wheezing Reported on 3/15/2017        ferrous gluconate 324 (38 FE) MG tablet    FERGON    60 tablet    Take 1 tablet (324 mg) by mouth 3 times daily (with meals)    Iron deficiency anemia secondary to inadequate dietary iron intake, Encounter for supervision of normal first pregnancy in second trimester       oxyCODONE-acetaminophen 5-325 MG per tablet    PERCOCET    10 tablet    Take 1-2 tablets by mouth every 6 hours as needed for pain maximum 8 tablet(s) per day    Episodic tension-type headache, not intractable       prenatal multivitamin  plus iron 27-0.8 MG Tabs per tablet     100 tablet    Take 1 tablet by mouth daily    Mild intermittent asthma without complication       TYLENOL PO      Take 500 mg by mouth every 4 hours as needed for mild pain or fever

## 2017-06-21 NOTE — PROGRESS NOTES
Baby is active. Denies any leaking of fluid, vaginal bleeding, regular uterine contractions. She has been having headaches on and off.  She had headaches before she was pregnant.  She feels like they are tension headaches.  Today she rates her headache at 7/10.  BP is higher than normal today.  Urine dip is negative for protein.    We reviewed strategies for headaches.  Advised her to call if Midwife on duty if headache continues, visual disturbances or epigastric pain.    She agrees to got to [facility] office to check her BP on Friday.    Reviewed to call [phone] for contractions, loss of fluid, vaginal bleeding, decreased fetal movement or any other questions or concerns.    RTC in 2 days.  Jill Gonzalez, RADHA, APRN, CNM

## 2017-06-22 ENCOUNTER — HOSPITAL ENCOUNTER (OUTPATIENT)
Facility: CLINIC | Age: 22
Discharge: HOME | End: 2017-06-22
Attending: ADVANCED PRACTICE MIDWIFE | Admitting: ADVANCED PRACTICE MIDWIFE
Payer: COMMERCIAL

## 2017-06-22 ENCOUNTER — TELEPHONE (OUTPATIENT)
Dept: MIDWIFE SERVICES | Facility: CLINIC | Age: 22
End: 2017-06-22

## 2017-06-22 VITALS — SYSTOLIC BLOOD PRESSURE: 130 MMHG | TEMPERATURE: 98.2 F | DIASTOLIC BLOOD PRESSURE: 79 MMHG | RESPIRATION RATE: 20 BRPM

## 2017-06-22 DIAGNOSIS — G44.209 TENSION HEADACHE: Primary | ICD-10-CM

## 2017-06-22 DIAGNOSIS — Z34.02 ENCOUNTER FOR SUPERVISION OF NORMAL FIRST PREGNANCY IN SECOND TRIMESTER: ICD-10-CM

## 2017-06-22 PROBLEM — Z36.89 ENCOUNTER FOR TRIAGE IN PREGNANT PATIENT: Status: ACTIVE | Noted: 2017-06-22

## 2017-06-22 PROCEDURE — 99213 OFFICE O/P EST LOW 20 MIN: CPT | Mod: 25 | Performed by: ADVANCED PRACTICE MIDWIFE

## 2017-06-22 PROCEDURE — 59025 FETAL NON-STRESS TEST: CPT

## 2017-06-22 PROCEDURE — 99214 OFFICE O/P EST MOD 30 MIN: CPT | Mod: 25

## 2017-06-22 PROCEDURE — 59025 FETAL NON-STRESS TEST: CPT | Mod: 26 | Performed by: ADVANCED PRACTICE MIDWIFE

## 2017-06-22 RX ORDER — OXYCODONE AND ACETAMINOPHEN 5; 325 MG/1; MG/1
1-2 TABLET ORAL EVERY 4 HOURS PRN
Qty: 10 TABLET | Refills: 0 | Status: ON HOLD | OUTPATIENT
Start: 2017-06-22 | End: 2017-06-26

## 2017-06-22 RX ORDER — HYDROXYZINE HYDROCHLORIDE 50 MG/1
100 TABLET, FILM COATED ORAL ONCE
Status: DISCONTINUED | OUTPATIENT
Start: 2017-06-22 | End: 2017-06-22 | Stop reason: HOSPADM

## 2017-06-22 RX ORDER — HYDROXYZINE HYDROCHLORIDE 25 MG/1
50-100 TABLET, FILM COATED ORAL
Qty: 20 TABLET | Refills: 0 | Status: SHIPPED | OUTPATIENT
Start: 2017-06-22

## 2017-06-22 NOTE — IP AVS SNAPSHOT
[facility]    [address]    [facility] [place] [address]    Phone:  [phone]                                       After Visit Summary   6/22/2017    Julieth Connelly    MRN: [id number]           After Visit Summary Signature Page     I have received my discharge instructions, and my questions have been answered. I have discussed any challenges I see with this plan with the nurse or doctor.    ..........................................................................................................................................  Patient/Patient Representative Signature      ..........................................................................................................................................  Patient Representative Print Name and Relationship to Patient    ..................................................               ................................................  Date                                            Time    ..........................................................................................................................................  Reviewed by Signature/Title    ...................................................              ..............................................  Date                                                            Time

## 2017-06-22 NOTE — PLAN OF CARE
Data: Patient [facility] at 1229. Reason for maternal/fetal assessment per patient is Rule Out Pre-eclampsia  . Patient is a . Prenatal record reviewed. Pt states having headache, problems sleeping.      Obstetric History       T0      L0     SAB0   TAB0   Ectopic0   Multiple0   Live Births0       # Outcome Date GA Lbr Aiden/2nd Weight Sex Delivery Anes PTL Lv   1 Current                  Medical History:   Past Medical History:   Diagnosis Date     Migraine without status migrainosus, not intractable     Migraine     Mild intermittent asthma, uncomplicated     Asthma   . Gestational Age 38w1d. VSS. Cervix: not examined.  Fetal movement present. Patient denies cramping, backache, vaginal discharge, pelvic pressure, UTI symptoms, GI problems, bloody show, vaginal bleeding, edema, visual disturbances, epigastric or URQ pain, abdominal pain, rupture of membranes. No support persons present.   Action: Verbal consent for EFM. Triage assessment completed. EFM applied for monitoring. Uterine assessment no ctxs. Fetal assessment: Presumed adequate fetal oxygenation documented (see flow record). Patient instructed to report change in fetal movement, vaginal leaking of fluid or bleeding, abdominal pain, or any concerns related to the pregnancy to her nurse/physician.   Response: CNARTUR Lisa informed of pt arrival and status Plan per provider after assessment is D/C home. Patient verbalized understanding of education and verbalized agreement with plan. Discharged ambulatory at 1332. Will  from her pharmacy Vistaril pills to help her sleep. NST done.

## 2017-06-22 NOTE — DISCHARGE INSTRUCTIONS
Discharge Instruction for Undelivered Patients      You were seen for: Rule out pre-eclampsia  We Consulted: FILIPE Gonzalez    Diet:   Regular       Call your provider if you notice:  Swelling in your face or increased swelling in your hands or legs.  Headaches that are not relieved by Tylenol (acetaminophen).  Changes in your vision (blurring: seeing spots or stars.)  Nausea (sick to your stomach) and vomiting (throwing up).   Weight gain of 5 pounds or more per week.  Heartburn that doesn't go away.  Signs of bladder infection: pain when you urinate (use the toilet), need to go more often and more urgently.  The bag of merida (rupture of membranes) breaks, or you notice leaking in your underwear.  Bright red blood in your underwear.  Abdominal (lower belly) or stomach pain.  For first baby: Contractions (tightening) less than 5 minutes apart for one hour or more.  Second (plus) baby: Contractions (tightening) less than 10 minutes apart and getting stronger.  *If less than 34 weeks: Contractions (tightenings) more than 6 times in one hour.  Increase or change in vaginal discharge (note the color and amount)    Follow-up:  As scheduled in the clinic

## 2017-06-22 NOTE — IP AVS SNAPSHOT
MRN:[id number]                      After Visit Summary   6/22/2017    Julieth Connelly    MRN: [id number]           Thank you!     Thank you for choosing [facility] for your care. Our goal is always to provide you with excellent care. Hearing back from our patients is one way we can continue to improve our services. Please take a few minutes to complete the written survey that you may receive in the mail after you visit with us. Thank you!        Patient Information     Date Of Birth          1995        Designated Caregiver       Most Recent Value    Caregiver    Will someone help with your care after discharge? no      About your hospital stay     You were admitted on:  June 22, 2017 You last received care in the:  [facility]    You were discharged on:  June 22, 2017       Who to Call     For medical emergencies, please call 911.  For non-urgent questions about your medical care, please call your primary care provider or clinic, [phone]          Attending Provider     Provider Jill Crandall CNM [facility]       Primary Care Provider Office Phone # Fax #    Alethea Schmid [phone]      Your next 10 appointments already scheduled     Jun 23, 2017  2:45 PM CDT   ESTABLISHED PRENATAL with MURALI Aguirre CNM   [facility] ([facility])    [address] [place] [id number] [phone]              Further instructions from your care team       Discharge Instruction for Undelivered Patients      You were seen for: Rule out pre-eclampsia  We Consulted: FILIPE Gonzalez    Diet:   Regular       Call your provider if you notice:  Swelling in your face or increased swelling in your hands or legs.  Headaches that are not relieved by Tylenol (acetaminophen).  Changes in your vision (blurring: seeing spots or stars.)  Nausea (sick to your stomach) and vomiting (throwing up).   Weight gain of 5  "pounds or more per week.  Heartburn that doesn't go away.  Signs of bladder infection: pain when you urinate (use the toilet), need to go more often and more urgently.  The bag of merida (rupture of membranes) breaks, or you notice leaking in your underwear.  Bright red blood in your underwear.  Abdominal (lower belly) or stomach pain.  For first baby: Contractions (tightening) less than 5 minutes apart for one hour or more.  Second (plus) baby: Contractions (tightening) less than 10 minutes apart and getting stronger.  *If less than 34 weeks: Contractions (tightenings) more than 6 times in one hour.  Increase or change in vaginal discharge (note the color and amount)    Follow-up:  As scheduled in the clinic          Pending Results     No orders found from 2017 to 2017.            Statement of Approval     Ordered          17 1324  I have reviewed and agree with all the recommendations and orders detailed in this document.  EFFECTIVE NOW     Approved and electronically signed by:  Jill Gonzalez CNM             Admission Information     Date & Time Provider Department Dept. Phone    2017 Jill Gonzalez CNM [facility] [phone]      Your Vitals Were     Blood Pressure                   130/79           [employer] Information     [employer] lets you send messages to your doctor, view your test results, renew your prescriptions, schedule appointments and more. To sign up, go to [email or web address] . Click on \"Log in\" on the left side of the screen, which will take you to the Welcome page. Then click on \"Sign up Now\" on the right side of the page.     You will be asked to enter the access code listed below, as well as some personal information. Please follow the directions to create your username and password.     Your access code is: [email or web address]  Expires: 2017  2:51 PM     Your access code will  in 90 days. If you need help or a new code, please call your [facility] clinic " or [phone].        Care EveryWhere ID     This is your Care EveryWhere ID. This could be used by other organizations to access your [facility] medical records  [id number]        Equal Access to Services     ROSSY ROLLE : Hadii aad ku hadjacqueline Sojulianna, waaxda luqadaha, qaybta kaalmada adeliseth, chuck thakkar laJaminernesto pérez. So [facility] [phone].    ATENCIÓN: Si habla español, tiene a morel disposición servicios gratuitos de asistencia lingüística. Llame al [phone].    We comply with applicable federal civil rights laws and [place] laws. We do not discriminate on the basis of race, color, national origin, age, disability sex, sexual orientation or gender identity.               Review of your medicines      START taking        Dose / Directions    hydrOXYzine 25 MG tablet   Commonly known as:  ATARAX   Used for:  Tension headache        Dose:   mg   Take 2-4 tablets ( mg) by mouth nightly as needed for anxiety (sleep and pain)   Quantity:  20 tablet   Refills:  0         CONTINUE these medicines which may have CHANGED, or have new prescriptions. If we are uncertain of the size of tablets/capsules you have at home, strength may be listed as something that might have changed.        Dose / Directions    * oxyCODONE-acetaminophen 5-325 MG per tablet   Commonly known as:  PERCOCET   This may have changed:  Another medication with the same name was added. Make sure you understand how and when to take each.   Used for:  Episodic tension-type headache, not intractable        Dose:  1-2 tablet   Take 1-2 tablets by mouth every 6 hours as needed for pain maximum 8 tablet(s) per day   Quantity:  10 tablet   Refills:  0       * oxyCODONE-acetaminophen 5-325 MG per tablet   Commonly known as:  PERCOCET   This may have changed:  You were already taking a medication with the same name, and this prescription was added. Make sure you understand how and when to take each.   Used for:  Tension  headache, Encounter for supervision of normal first pregnancy in second trimester        Dose:  1-2 tablet   Take 1-2 tablets by mouth every 4 hours as needed for moderate to severe pain   Quantity:  10 tablet   Refills:  0       * Notice:  This list has 2 medication(s) that are the same as other medications prescribed for you. Read the directions carefully, and ask your doctor or other care provider to review them with you.      CONTINUE these medicines which have NOT CHANGED        Dose / Directions    albuterol 108 (90 BASE) MCG/ACT Inhaler   Commonly known as:  PROAIR HFA/PROVENTIL HFA/VENTOLIN HFA        Dose:  2 puff   Inhale 2 puffs into the lungs every 6 hours as needed for shortness of breath / dyspnea or wheezing Reported on 3/15/2017   Quantity:  3 Inhaler   Refills:  1       ferrous gluconate 324 (38 FE) MG tablet   Commonly known as:  FERGON   Used for:  Iron deficiency anemia secondary to inadequate dietary iron intake, Encounter for supervision of normal first pregnancy in second trimester        Dose:  324 mg   Take 1 tablet (324 mg) by mouth 3 times daily (with meals)   Quantity:  60 tablet   Refills:  0       prenatal multivitamin  plus iron 27-0.8 MG Tabs per tablet   Used for:  Mild intermittent asthma without complication        Dose:  1 tablet   Take 1 tablet by mouth daily   Quantity:  100 tablet   Refills:  3       TYLENOL PO   Indication:  Pain        Dose:  500 mg   Take 500 mg by mouth every 4 hours as needed for mild pain or fever   Refills:  0            Where to get your medicines      These medications were sent to [facility]/pharmacy #[id number] - [place], [place] - [address]  [address] [address]     Phone:  [phone]     hydrOXYzine 25 MG tablet         Some of these will need a paper prescription and others can be bought over the counter. Ask your nurse if you have questions.     Bring a paper prescription for each of these medications      oxyCODONE-acetaminophen 5-325 MG per tablet                Protect others around you: Learn how to safely use, store and throw away your medicines at www.disposemymeds.org.             Medication List: This is a list of all your medications and when to take them. Check marks below indicate your daily home schedule. Keep this list as a reference.      Medications           Morning Afternoon Evening Bedtime As Needed    albuterol 108 (90 BASE) MCG/ACT Inhaler   Commonly known as:  PROAIR HFA/PROVENTIL HFA/VENTOLIN HFA   Inhale 2 puffs into the lungs every 6 hours as needed for shortness of breath / dyspnea or wheezing Reported on 3/15/2017                                ferrous gluconate 324 (38 FE) MG tablet   Commonly known as:  FERGON   Take 1 tablet (324 mg) by mouth 3 times daily (with meals)                                hydrOXYzine 25 MG tablet   Commonly known as:  ATARAX   Take 2-4 tablets ( mg) by mouth nightly as needed for anxiety (sleep and pain)                                * oxyCODONE-acetaminophen 5-325 MG per tablet   Commonly known as:  PERCOCET   Take 1-2 tablets by mouth every 6 hours as needed for pain maximum 8 tablet(s) per day                                * oxyCODONE-acetaminophen 5-325 MG per tablet   Commonly known as:  PERCOCET   Take 1-2 tablets by mouth every 4 hours as needed for moderate to severe pain                                prenatal multivitamin  plus iron 27-0.8 MG Tabs per tablet   Take 1 tablet by mouth daily                                TYLENOL PO   Take 500 mg by mouth every 4 hours as needed for mild pain or fever                                * Notice:  This list has 2 medication(s) that are the same as other medications prescribed for you. Read the directions carefully, and ask your doctor or other care provider to review them with you.

## 2017-06-22 NOTE — TELEPHONE ENCOUNTER
Pt is calling to discuss headache.  She is concerned because it has been ongoing for two hours and it is not going away.  No vision changes or upper epigastric pain.  She did throw up this morning.  States she was told yesterday to call and speak to the on-call CNM if headache was persistent.  Can you call her to further assess?  Xuan Alston RN

## 2017-06-22 NOTE — PROGRESS NOTES
Jluieth Connelly is a 22 year old female,  -American,   single      Patient's LMP from OB Dating Form was 9/10/2016.. Estimated Date of Delivery: 2017 is calculated from lmp and verified with U/S    [facility] of headache    HPI Did not sleep well last night and has a headache.  She has a history of headaches before and during pregnancy. Ibuprofen worked before she was pregnant.      PRENATAL COURSE  Prenatal care began at 15 wks gestation for a total of 13 prenatal visits.  Total wt gain 35 lbs  Prenatal vital signs WNL  Prenatal course was complicated by headaches     HISTORIES  No Known Allergies  Past Medical History:   Diagnosis Date     Migraine without status migrainosus, not intractable     Migraine     Mild intermittent asthma, uncomplicated     Asthma     Past Surgical History:   Procedure Laterality Date     APPENDECTOMY  2015     TONSILLECTOMY       Family History   Problem Relation Age of Onset     Coronary Artery Disease Maternal Grandmother      Hypertension Maternal Grandmother      Hypertension Mother      Anemia Mother      DIABETES Father      DIABETES Paternal Grandfather      Other Cancer Paternal Grandfather      DIABETES Paternal Uncle      Social History   Substance Use Topics     Smoking status: Never Smoker     Smokeless tobacco: Not on file     Alcohol use No       LABS:  Negative urine dip yesterday at [facility]     Lab Results   Component Value Date    ABO O 2017       Lab Results   Component Value Date    RH  Pos 2017     Rhogam not indicated  Rubella immune   Treponema Pallidum Antibody  Negative   HIV    Non-reactive   Lab Results   Component Value Date    HGB 9.9 2017      Lab Results   Component Value Date    HEPBANG Nonreactive 2017     Lab Results   Component Value Date    GBS  2017     Negative  No GBS DNA detected, presumed negative for GBS or number of bacteria may be   below the  limit of detection of the assay.   Assay performed on incubated broth culture of specimen using [employer] real-time   PCR.         ROS  C: NEGATIVE for fever, chills, change in weight  I: NEGATIVE for worrisome rashes, moles or lesions  E/M: NEGATIVE for ear, mouth and throat problems  R: NEGATIVE for significant cough or SOB  CV: NEGATIVE for chest pain, palpitations or peripheral edema  GI: NEGATIVE for nausea, abdominal pain, heartburn, or change in bowel habits  : NEGATIVE for frequency, dysuria, or hematuria  PSYCHIATRIC:  NEGATIVE for depression, anxiety or other mental health concerns    PHYSICAL EXAM:  /79, 124/75 - BPs WNL   General appearance healthy, alert, active, mild distress and cooperative  Neuro:  denies headache and visual disturbances  Psych: Mentation normal and bright   Legs: 2+/2+, no clonus, no edema       Abdomen: gravid, single vertex fetus, non-tender, EFW 7 lbs. Pt is not richard    FETAL HEART TONES: baseline 145 with moderate FHR variability and accelerations. No decelerations present.     MEMBRANES: Membranes are intact    PELVIC EXAM: Deferred  BLOODY SHOW:: no    ASSESSMENT:  IUP @ 38 wks gestation in not in labor  NST  reactive    Headache   BP WNL      PLAN:  Home with labor instructions per discharge instruction form.  Medications given vistaril for rest.  Small amount of Vistaril and Vicodin      Jill Gonzalez, RADHA, APRN, CNM

## 2017-06-24 ENCOUNTER — HOSPITAL ENCOUNTER (INPATIENT)
Facility: CLINIC | Age: 22
LOS: 3 days | Discharge: HOME HEALTH | End: 2017-06-27
Attending: ADVANCED PRACTICE MIDWIFE | Admitting: OBSTETRICS & GYNECOLOGY
Payer: COMMERCIAL

## 2017-06-24 ENCOUNTER — ANESTHESIA EVENT (OUTPATIENT)
Dept: OBGYN | Facility: CLINIC | Age: 22
End: 2017-06-24
Payer: COMMERCIAL

## 2017-06-24 ENCOUNTER — ANESTHESIA (OUTPATIENT)
Dept: OBGYN | Facility: CLINIC | Age: 22
End: 2017-06-24
Payer: COMMERCIAL

## 2017-06-24 ENCOUNTER — SURGERY (OUTPATIENT)
Age: 22
End: 2017-06-24

## 2017-06-24 ENCOUNTER — NURSE TRIAGE (OUTPATIENT)
Dept: NURSING | Facility: CLINIC | Age: 22
End: 2017-06-24

## 2017-06-24 DIAGNOSIS — Z98.891 S/P CESAREAN SECTION: Primary | ICD-10-CM

## 2017-06-24 PROBLEM — O28.8 NON-REACTIVE NST (NON-STRESS TEST): Status: ACTIVE | Noted: 2017-06-24

## 2017-06-24 LAB
ABO + RH BLD: NORMAL
ABO + RH BLD: NORMAL
BASOPHILS # BLD AUTO: 0 10E9/L (ref 0–0.2)
BASOPHILS NFR BLD AUTO: 0.1 %
BLD GP AB SCN SERPL QL: NORMAL
BLOOD BANK CMNT PATIENT-IMP: NORMAL
DIFFERENTIAL METHOD BLD: ABNORMAL
EOSINOPHIL # BLD AUTO: 0.1 10E9/L (ref 0–0.7)
EOSINOPHIL NFR BLD AUTO: 0.6 %
ERYTHROCYTE [DISTWIDTH] IN BLOOD BY AUTOMATED COUNT: 14.2 % (ref 10–15)
HCT VFR BLD AUTO: 31.9 % (ref 35–47)
HGB BLD-MCNC: 10.2 G/DL (ref 11.7–15.7)
IMM GRANULOCYTES # BLD: 0.1 10E9/L (ref 0–0.4)
IMM GRANULOCYTES NFR BLD: 0.7 %
LYMPHOCYTES # BLD AUTO: 2.3 10E9/L (ref 0.8–5.3)
LYMPHOCYTES NFR BLD AUTO: 21.6 %
MCH RBC QN AUTO: 24.9 PG (ref 26.5–33)
MCHC RBC AUTO-ENTMCNC: 32 G/DL (ref 31.5–36.5)
MCV RBC AUTO: 78 FL (ref 78–100)
MONOCYTES # BLD AUTO: 0.7 10E9/L (ref 0–1.3)
MONOCYTES NFR BLD AUTO: 6.7 %
NEUTROPHILS # BLD AUTO: 7.5 10E9/L (ref 1.6–8.3)
NEUTROPHILS NFR BLD AUTO: 70.3 %
NRBC # BLD AUTO: 0 10*3/UL
NRBC BLD AUTO-RTO: 0 /100
PLATELET # BLD AUTO: 267 10E9/L (ref 150–450)
RBC # BLD AUTO: 4.1 10E12/L (ref 3.8–5.2)
SPECIMEN EXP DATE BLD: NORMAL
WBC # BLD AUTO: 10.6 10E9/L (ref 4–11)

## 2017-06-24 PROCEDURE — 86900 BLOOD TYPING SEROLOGIC ABO: CPT | Performed by: ADVANCED PRACTICE MIDWIFE

## 2017-06-24 PROCEDURE — 40000977 ZZH STATISTIC ATTENDANCE AT DELIVERY

## 2017-06-24 PROCEDURE — 59515 CESAREAN DELIVERY: CPT | Mod: GC | Performed by: OBSTETRICS & GYNECOLOGY

## 2017-06-24 PROCEDURE — 12000032 ZZH R&B OB CRITICAL UMMC

## 2017-06-24 PROCEDURE — 27210794 ZZH OR GENERAL SUPPLY STERILE: Performed by: OBSTETRICS & GYNECOLOGY

## 2017-06-24 PROCEDURE — 85025 COMPLETE CBC W/AUTO DIFF WBC: CPT | Performed by: ADVANCED PRACTICE MIDWIFE

## 2017-06-24 PROCEDURE — 37000008 ZZH ANESTHESIA TECHNICAL FEE, 1ST 30 MIN: Performed by: OBSTETRICS & GYNECOLOGY

## 2017-06-24 PROCEDURE — 25000125 ZZHC RX 250: Performed by: ADVANCED PRACTICE MIDWIFE

## 2017-06-24 PROCEDURE — C9290 INJ, BUPIVACAINE LIPOSOME: HCPCS | Performed by: ANESTHESIOLOGY

## 2017-06-24 PROCEDURE — 40000170 ZZH STATISTIC PRE-PROCEDURE ASSESSMENT II: Performed by: OBSTETRICS & GYNECOLOGY

## 2017-06-24 PROCEDURE — 25000125 ZZHC RX 250: Performed by: OBSTETRICS & GYNECOLOGY

## 2017-06-24 PROCEDURE — 25000128 H RX IP 250 OP 636: Performed by: ANESTHESIOLOGY

## 2017-06-24 PROCEDURE — 86901 BLOOD TYPING SEROLOGIC RH(D): CPT | Performed by: ADVANCED PRACTICE MIDWIFE

## 2017-06-24 PROCEDURE — 36415 COLL VENOUS BLD VENIPUNCTURE: CPT | Performed by: ADVANCED PRACTICE MIDWIFE

## 2017-06-24 PROCEDURE — 86780 TREPONEMA PALLIDUM: CPT | Performed by: ADVANCED PRACTICE MIDWIFE

## 2017-06-24 PROCEDURE — 99215 OFFICE O/P EST HI 40 MIN: CPT

## 2017-06-24 PROCEDURE — 25000128 H RX IP 250 OP 636: Performed by: ADVANCED PRACTICE MIDWIFE

## 2017-06-24 PROCEDURE — 99215 OFFICE O/P EST HI 40 MIN: CPT | Mod: 25

## 2017-06-24 PROCEDURE — 71000014 ZZH RECOVERY PHASE 1 LEVEL 2 FIRST HR: Performed by: OBSTETRICS & GYNECOLOGY

## 2017-06-24 PROCEDURE — 25000128 H RX IP 250 OP 636: Performed by: NURSE ANESTHETIST, CERTIFIED REGISTERED

## 2017-06-24 PROCEDURE — 36000057 ZZH SURGERY LEVEL 3 1ST 30 MIN - UMMC: Performed by: OBSTETRICS & GYNECOLOGY

## 2017-06-24 PROCEDURE — 37000009 ZZH ANESTHESIA TECHNICAL FEE, EACH ADDTL 15 MIN: Performed by: OBSTETRICS & GYNECOLOGY

## 2017-06-24 PROCEDURE — 86850 RBC ANTIBODY SCREEN: CPT | Performed by: ADVANCED PRACTICE MIDWIFE

## 2017-06-24 PROCEDURE — 59025 FETAL NON-STRESS TEST: CPT

## 2017-06-24 PROCEDURE — 27110028 ZZH OR GENERAL SUPPLY NON-STERILE: Performed by: OBSTETRICS & GYNECOLOGY

## 2017-06-24 PROCEDURE — 25000132 ZZH RX MED GY IP 250 OP 250 PS 637

## 2017-06-24 PROCEDURE — 36000059 ZZH SURGERY LEVEL 3 EA 15 ADDTL MIN UMMC: Performed by: OBSTETRICS & GYNECOLOGY

## 2017-06-24 PROCEDURE — 25000125 ZZHC RX 250: Performed by: ANESTHESIOLOGY

## 2017-06-24 PROCEDURE — 40000010 ZZH STATISTIC ANES STAT CODE-CRNA PER MINUTE: Performed by: OBSTETRICS & GYNECOLOGY

## 2017-06-24 PROCEDURE — C1765 ADHESION BARRIER: HCPCS | Performed by: OBSTETRICS & GYNECOLOGY

## 2017-06-24 PROCEDURE — 71000015 ZZH RECOVERY PHASE 1 LEVEL 2 EA ADDTL HR: Performed by: OBSTETRICS & GYNECOLOGY

## 2017-06-24 RX ORDER — HYDROCORTISONE 2.5 %
CREAM (GRAM) TOPICAL 3 TIMES DAILY PRN
Status: DISCONTINUED | OUTPATIENT
Start: 2017-06-24 | End: 2017-06-27 | Stop reason: HOSPADM

## 2017-06-24 RX ORDER — OXYTOCIN/0.9 % SODIUM CHLORIDE 30/500 ML
340 PLASTIC BAG, INJECTION (ML) INTRAVENOUS CONTINUOUS PRN
Status: DISCONTINUED | OUTPATIENT
Start: 2017-06-24 | End: 2017-06-27 | Stop reason: HOSPADM

## 2017-06-24 RX ORDER — OXYCODONE AND ACETAMINOPHEN 5; 325 MG/1; MG/1
1 TABLET ORAL
Status: DISCONTINUED | OUTPATIENT
Start: 2017-06-24 | End: 2017-06-24

## 2017-06-24 RX ORDER — ACETAMINOPHEN 325 MG/1
650 TABLET ORAL EVERY 4 HOURS PRN
Status: DISCONTINUED | OUTPATIENT
Start: 2017-06-24 | End: 2017-06-24

## 2017-06-24 RX ORDER — SODIUM CHLORIDE, SODIUM LACTATE, POTASSIUM CHLORIDE, CALCIUM CHLORIDE 600; 310; 30; 20 MG/100ML; MG/100ML; MG/100ML; MG/100ML
INJECTION, SOLUTION INTRAVENOUS
Status: DISCONTINUED
Start: 2017-06-24 | End: 2017-06-24 | Stop reason: HOSPADM

## 2017-06-24 RX ORDER — ACETAMINOPHEN 325 MG/1
650 TABLET ORAL EVERY 4 HOURS PRN
Status: DISCONTINUED | OUTPATIENT
Start: 2017-06-27 | End: 2017-06-27 | Stop reason: HOSPADM

## 2017-06-24 RX ORDER — NALOXONE HYDROCHLORIDE 0.4 MG/ML
.1-.4 INJECTION, SOLUTION INTRAMUSCULAR; INTRAVENOUS; SUBCUTANEOUS
Status: DISCONTINUED | OUTPATIENT
Start: 2017-06-24 | End: 2017-06-27 | Stop reason: HOSPADM

## 2017-06-24 RX ORDER — SODIUM CHLORIDE, SODIUM LACTATE, POTASSIUM CHLORIDE, CALCIUM CHLORIDE 600; 310; 30; 20 MG/100ML; MG/100ML; MG/100ML; MG/100ML
INJECTION, SOLUTION INTRAVENOUS CONTINUOUS
Status: DISCONTINUED | OUTPATIENT
Start: 2017-06-24 | End: 2017-06-24

## 2017-06-24 RX ORDER — CARBOPROST TROMETHAMINE 250 UG/ML
250 INJECTION, SOLUTION INTRAMUSCULAR
Status: DISCONTINUED | OUTPATIENT
Start: 2017-06-24 | End: 2017-06-24

## 2017-06-24 RX ORDER — CEFAZOLIN SODIUM 1 G/3ML
INJECTION, POWDER, FOR SOLUTION INTRAMUSCULAR; INTRAVENOUS PRN
Status: DISCONTINUED | OUTPATIENT
Start: 2017-06-24 | End: 2017-06-24

## 2017-06-24 RX ORDER — FENTANYL CITRATE 50 UG/ML
25-50 INJECTION, SOLUTION INTRAMUSCULAR; INTRAVENOUS
Status: CANCELLED | OUTPATIENT
Start: 2017-06-24

## 2017-06-24 RX ORDER — SIMETHICONE 80 MG
80 TABLET,CHEWABLE ORAL 4 TIMES DAILY PRN
Status: DISCONTINUED | OUTPATIENT
Start: 2017-06-24 | End: 2017-06-27 | Stop reason: HOSPADM

## 2017-06-24 RX ORDER — ALBUTEROL SULFATE 90 UG/1
2 AEROSOL, METERED RESPIRATORY (INHALATION) EVERY 6 HOURS PRN
Status: DISCONTINUED | OUTPATIENT
Start: 2017-06-24 | End: 2017-06-24

## 2017-06-24 RX ORDER — DIPHENHYDRAMINE HYDROCHLORIDE 50 MG/ML
25 INJECTION INTRAMUSCULAR; INTRAVENOUS EVERY 6 HOURS PRN
Status: DISCONTINUED | OUTPATIENT
Start: 2017-06-24 | End: 2017-06-27 | Stop reason: HOSPADM

## 2017-06-24 RX ORDER — ONDANSETRON 4 MG/1
4 TABLET, ORALLY DISINTEGRATING ORAL EVERY 30 MIN PRN
Status: CANCELLED | OUTPATIENT
Start: 2017-06-24

## 2017-06-24 RX ORDER — CEFAZOLIN SODIUM 1 G/3ML
1 INJECTION, POWDER, FOR SOLUTION INTRAMUSCULAR; INTRAVENOUS
Status: CANCELLED | OUTPATIENT
Start: 2017-06-24

## 2017-06-24 RX ORDER — NALBUPHINE HYDROCHLORIDE 10 MG/ML
2.5-5 INJECTION, SOLUTION INTRAMUSCULAR; INTRAVENOUS; SUBCUTANEOUS EVERY 6 HOURS PRN
Status: DISCONTINUED | OUTPATIENT
Start: 2017-06-24 | End: 2017-06-24 | Stop reason: HOSPADM

## 2017-06-24 RX ORDER — MORPHINE SULFATE 1 MG/ML
0.15 INJECTION, SOLUTION EPIDURAL; INTRATHECAL; INTRAVENOUS ONCE
Status: COMPLETED | OUTPATIENT
Start: 2017-06-24 | End: 2017-06-24

## 2017-06-24 RX ORDER — OXYCODONE HYDROCHLORIDE 5 MG/1
5-10 TABLET ORAL
Status: DISCONTINUED | OUTPATIENT
Start: 2017-06-24 | End: 2017-06-27 | Stop reason: HOSPADM

## 2017-06-24 RX ORDER — ONDANSETRON 2 MG/ML
4 INJECTION INTRAMUSCULAR; INTRAVENOUS EVERY 30 MIN PRN
Status: CANCELLED | OUTPATIENT
Start: 2017-06-24

## 2017-06-24 RX ORDER — FENTANYL CITRATE 50 UG/ML
50-100 INJECTION, SOLUTION INTRAMUSCULAR; INTRAVENOUS
Status: DISCONTINUED | OUTPATIENT
Start: 2017-06-24 | End: 2017-06-24

## 2017-06-24 RX ORDER — OXYTOCIN/0.9 % SODIUM CHLORIDE 30/500 ML
100-340 PLASTIC BAG, INJECTION (ML) INTRAVENOUS CONTINUOUS PRN
Status: COMPLETED | OUTPATIENT
Start: 2017-06-24 | End: 2017-06-24

## 2017-06-24 RX ORDER — SODIUM CHLORIDE, SODIUM LACTATE, POTASSIUM CHLORIDE, CALCIUM CHLORIDE 600; 310; 30; 20 MG/100ML; MG/100ML; MG/100ML; MG/100ML
INJECTION, SOLUTION INTRAVENOUS CONTINUOUS
Status: CANCELLED | OUTPATIENT
Start: 2017-06-24

## 2017-06-24 RX ORDER — METHYLERGONOVINE MALEATE 0.2 MG/ML
200 INJECTION INTRAVENOUS
Status: DISCONTINUED | OUTPATIENT
Start: 2017-06-24 | End: 2017-06-24

## 2017-06-24 RX ORDER — OXYTOCIN 10 [USP'U]/ML
10 INJECTION, SOLUTION INTRAMUSCULAR; INTRAVENOUS
Status: DISCONTINUED | OUTPATIENT
Start: 2017-06-24 | End: 2017-06-24

## 2017-06-24 RX ORDER — BUPIVACAINE HYDROCHLORIDE 7.5 MG/ML
INJECTION, SOLUTION INTRASPINAL PRN
Status: DISCONTINUED | OUTPATIENT
Start: 2017-06-24 | End: 2017-06-24

## 2017-06-24 RX ORDER — AMOXICILLIN 250 MG
1-2 CAPSULE ORAL 2 TIMES DAILY
Status: DISCONTINUED | OUTPATIENT
Start: 2017-06-24 | End: 2017-06-27 | Stop reason: HOSPADM

## 2017-06-24 RX ORDER — CITRIC ACID/SODIUM CITRATE 334-500MG
30 SOLUTION, ORAL ORAL
Status: CANCELLED | OUTPATIENT
Start: 2017-06-24

## 2017-06-24 RX ORDER — CEFAZOLIN SODIUM 2 G/100ML
2 INJECTION, SOLUTION INTRAVENOUS
Status: CANCELLED | OUTPATIENT
Start: 2017-06-24

## 2017-06-24 RX ORDER — ONDANSETRON 2 MG/ML
INJECTION INTRAMUSCULAR; INTRAVENOUS PRN
Status: DISCONTINUED | OUTPATIENT
Start: 2017-06-24 | End: 2017-06-24

## 2017-06-24 RX ORDER — DIPHENHYDRAMINE HCL 25 MG
25 CAPSULE ORAL EVERY 6 HOURS PRN
Status: DISCONTINUED | OUTPATIENT
Start: 2017-06-24 | End: 2017-06-27 | Stop reason: HOSPADM

## 2017-06-24 RX ORDER — ONDANSETRON 2 MG/ML
4 INJECTION INTRAMUSCULAR; INTRAVENOUS EVERY 6 HOURS PRN
Status: DISCONTINUED | OUTPATIENT
Start: 2017-06-24 | End: 2017-06-27 | Stop reason: HOSPADM

## 2017-06-24 RX ORDER — OXYTOCIN/0.9 % SODIUM CHLORIDE 30/500 ML
PLASTIC BAG, INJECTION (ML) INTRAVENOUS
Status: DISCONTINUED
Start: 2017-06-24 | End: 2017-06-26 | Stop reason: HOSPADM

## 2017-06-24 RX ORDER — CITRIC ACID/SODIUM CITRATE 334-500MG
SOLUTION, ORAL ORAL
Status: COMPLETED
Start: 2017-06-24 | End: 2017-06-24

## 2017-06-24 RX ORDER — NALOXONE HYDROCHLORIDE 0.4 MG/ML
.1-.4 INJECTION, SOLUTION INTRAMUSCULAR; INTRAVENOUS; SUBCUTANEOUS
Status: DISCONTINUED | OUTPATIENT
Start: 2017-06-24 | End: 2017-06-24 | Stop reason: HOSPADM

## 2017-06-24 RX ORDER — EPHEDRINE SULFATE 50 MG/ML
5 INJECTION, SOLUTION INTRAMUSCULAR; INTRAVENOUS; SUBCUTANEOUS
Status: DISCONTINUED | OUTPATIENT
Start: 2017-06-24 | End: 2017-06-24 | Stop reason: HOSPADM

## 2017-06-24 RX ORDER — ONDANSETRON 2 MG/ML
4 INJECTION INTRAMUSCULAR; INTRAVENOUS EVERY 6 HOURS PRN
Status: DISCONTINUED | OUTPATIENT
Start: 2017-06-24 | End: 2017-06-24

## 2017-06-24 RX ORDER — LIDOCAINE 40 MG/G
CREAM TOPICAL
Status: DISCONTINUED | OUTPATIENT
Start: 2017-06-24 | End: 2017-06-24 | Stop reason: HOSPADM

## 2017-06-24 RX ORDER — FENTANYL CITRATE 50 UG/ML
INJECTION, SOLUTION INTRAMUSCULAR; INTRAVENOUS PRN
Status: DISCONTINUED | OUTPATIENT
Start: 2017-06-24 | End: 2017-06-24

## 2017-06-24 RX ORDER — NALOXONE HYDROCHLORIDE 0.4 MG/ML
.1-.4 INJECTION, SOLUTION INTRAMUSCULAR; INTRAVENOUS; SUBCUTANEOUS
Status: DISCONTINUED | OUTPATIENT
Start: 2017-06-24 | End: 2017-06-24

## 2017-06-24 RX ORDER — BUPIVACAINE HYDROCHLORIDE AND EPINEPHRINE 2.5; 5 MG/ML; UG/ML
INJECTION, SOLUTION INFILTRATION; PERINEURAL PRN
Status: DISCONTINUED | OUTPATIENT
Start: 2017-06-24 | End: 2017-06-24

## 2017-06-24 RX ORDER — DEXTROSE, SODIUM CHLORIDE, SODIUM LACTATE, POTASSIUM CHLORIDE, AND CALCIUM CHLORIDE 5; .6; .31; .03; .02 G/100ML; G/100ML; G/100ML; G/100ML; G/100ML
INJECTION, SOLUTION INTRAVENOUS CONTINUOUS
Status: DISCONTINUED | OUTPATIENT
Start: 2017-06-24 | End: 2017-06-27 | Stop reason: HOSPADM

## 2017-06-24 RX ORDER — CITRIC ACID/SODIUM CITRATE 334-500MG
30 SOLUTION, ORAL ORAL ONCE
Status: DISCONTINUED | OUTPATIENT
Start: 2017-06-24 | End: 2017-06-24 | Stop reason: HOSPADM

## 2017-06-24 RX ORDER — OXYTOCIN 10 [USP'U]/ML
10 INJECTION, SOLUTION INTRAMUSCULAR; INTRAVENOUS
Status: DISCONTINUED | OUTPATIENT
Start: 2017-06-24 | End: 2017-06-27 | Stop reason: HOSPADM

## 2017-06-24 RX ORDER — KETOROLAC TROMETHAMINE 30 MG/ML
30 INJECTION, SOLUTION INTRAMUSCULAR; INTRAVENOUS EVERY 6 HOURS
Status: DISPENSED | OUTPATIENT
Start: 2017-06-25 | End: 2017-06-26

## 2017-06-24 RX ORDER — LIDOCAINE 40 MG/G
CREAM TOPICAL
Status: DISCONTINUED | OUTPATIENT
Start: 2017-06-24 | End: 2017-06-27 | Stop reason: HOSPADM

## 2017-06-24 RX ORDER — MISOPROSTOL 200 UG/1
400 TABLET ORAL
Status: DISCONTINUED | OUTPATIENT
Start: 2017-06-24 | End: 2017-06-27 | Stop reason: HOSPADM

## 2017-06-24 RX ORDER — OXYTOCIN/0.9 % SODIUM CHLORIDE 30/500 ML
100 PLASTIC BAG, INJECTION (ML) INTRAVENOUS CONTINUOUS
Status: DISCONTINUED | OUTPATIENT
Start: 2017-06-24 | End: 2017-06-27 | Stop reason: HOSPADM

## 2017-06-24 RX ORDER — IBUPROFEN 800 MG/1
800 TABLET, FILM COATED ORAL
Status: DISCONTINUED | OUTPATIENT
Start: 2017-06-24 | End: 2017-06-24

## 2017-06-24 RX ORDER — LANOLIN 100 %
OINTMENT (GRAM) TOPICAL
Status: DISCONTINUED | OUTPATIENT
Start: 2017-06-24 | End: 2017-06-27 | Stop reason: HOSPADM

## 2017-06-24 RX ORDER — KETOROLAC TROMETHAMINE 30 MG/ML
INJECTION, SOLUTION INTRAMUSCULAR; INTRAVENOUS PRN
Status: DISCONTINUED | OUTPATIENT
Start: 2017-06-24 | End: 2017-06-24

## 2017-06-24 RX ORDER — BISACODYL 10 MG
10 SUPPOSITORY, RECTAL RECTAL DAILY PRN
Status: DISCONTINUED | OUTPATIENT
Start: 2017-06-26 | End: 2017-06-27 | Stop reason: HOSPADM

## 2017-06-24 RX ORDER — ACETAMINOPHEN 325 MG/1
975 TABLET ORAL EVERY 8 HOURS
Status: DISCONTINUED | OUTPATIENT
Start: 2017-06-24 | End: 2017-06-27 | Stop reason: HOSPADM

## 2017-06-24 RX ORDER — IBUPROFEN 400 MG/1
400-800 TABLET, FILM COATED ORAL EVERY 6 HOURS PRN
Status: DISCONTINUED | OUTPATIENT
Start: 2017-06-24 | End: 2017-06-27 | Stop reason: HOSPADM

## 2017-06-24 RX ADMIN — BUPIVACAINE 20 ML: 13.3 INJECTION, SUSPENSION, LIPOSOMAL INFILTRATION at 19:34

## 2017-06-24 RX ADMIN — PHENYLEPHRINE HYDROCHLORIDE 100 MCG: 10 INJECTION, SOLUTION INTRAMUSCULAR; INTRAVENOUS; SUBCUTANEOUS at 18:32

## 2017-06-24 RX ADMIN — CEFAZOLIN 2 G: 1 INJECTION, POWDER, FOR SOLUTION INTRAMUSCULAR; INTRAVENOUS at 18:35

## 2017-06-24 RX ADMIN — OXYTOCIN-SODIUM CHLORIDE 0.9% IV SOLN 30 UNIT/500ML 300 ML/HR: 30-0.9/5 SOLUTION at 18:48

## 2017-06-24 RX ADMIN — SODIUM CHLORIDE, POTASSIUM CHLORIDE, SODIUM LACTATE AND CALCIUM CHLORIDE: 600; 310; 30; 20 INJECTION, SOLUTION INTRAVENOUS at 18:23

## 2017-06-24 RX ADMIN — BUPIVACAINE HYDROCHLORIDE AND EPINEPHRINE BITARTRATE 20 ML: 2.5; .005 INJECTION, SOLUTION INFILTRATION; PERINEURAL at 19:34

## 2017-06-24 RX ADMIN — ONDANSETRON 4 MG: 2 INJECTION INTRAMUSCULAR; INTRAVENOUS at 18:50

## 2017-06-24 RX ADMIN — PHENYLEPHRINE HYDROCHLORIDE 0.5 MCG/KG/MIN: 10 INJECTION, SOLUTION INTRAMUSCULAR; INTRAVENOUS; SUBCUTANEOUS at 18:33

## 2017-06-24 RX ADMIN — MORPHINE SULFATE 0.15 MG: 5 INJECTION, SOLUTION INTRAVENOUS at 18:29

## 2017-06-24 RX ADMIN — SODIUM CITRATE AND CITRIC ACID MONOHYDRATE 30 ML: 500; 334 SOLUTION ORAL at 18:14

## 2017-06-24 RX ADMIN — FENTANYL CITRATE 15 MCG: 50 INJECTION, SOLUTION INTRAMUSCULAR; INTRAVENOUS at 18:29

## 2017-06-24 RX ADMIN — OXYTOCIN-SODIUM CHLORIDE 0.9% IV SOLN 30 UNIT/500ML 100 ML/HR: 30-0.9/5 SOLUTION at 22:48

## 2017-06-24 RX ADMIN — KETOROLAC TROMETHAMINE 30 MG: 30 INJECTION, SOLUTION INTRAMUSCULAR at 19:40

## 2017-06-24 RX ADMIN — BUPIVACAINE HYDROCHLORIDE IN DEXTROSE 1.6 ML: 7.5 INJECTION, SOLUTION SUBARACHNOID at 18:29

## 2017-06-24 RX ADMIN — FENTANYL CITRATE 50 MCG: 50 INJECTION, SOLUTION INTRAMUSCULAR; INTRAVENOUS at 21:10

## 2017-06-24 NOTE — PROGRESS NOTES
Have attempted intrauterine resuscitation with IV fluid bolus, O2 and position changes.  FHTs have remained with minimal variability x 1 hr.    Call to Dr Fong, report given.    Prepare for  section.    Maritza Ball CNM

## 2017-06-24 NOTE — H&P
[facility]  OB History and Physical      Julieth Connelly MRN# [id number]   Age: 22 year old YOB: 1995     CC:    section for fetal distress    HPI:  Ms. Julieth Connelly is a 22 year old  at 38w3d by 19w0d, who presents for decreased fetal movement found to have Cat II FHT with minimal variability remote from delivery. No concerns for labor at this time.  She has not felt fetal movement since yesterday evening. No other concerns at this time.      Today, she states that she has been feeling well overall despite not feeling her baby move. She denies contractions, vaginal bleeding, or leakage of fluid.     Pregnancy Complications:  None    Prenatal Labs:   Lab Results   Component Value Date    ABO Pending 2017    RH Pending 2017    AS Pending 2017    HEPBANG Nonreactive 2017    CHPCRT  2017     Negative   Negative for C. trachomatis rRNA by transcription mediated amplification.   A negative result by transcription mediated amplification does not preclude the   presence of C. trachomatis infection because results are dependent on proper   and adequate collection, absence of inhibitors, and sufficient rRNA to be   detected.      GCPCRT  2017     Negative   Negative for N. gonorrhoeae rRNA by transcription mediated amplification.   A negative result by transcription mediated amplification does not preclude the   presence of N. gonorrhoeae infection because results are dependent on proper   and adequate collection, absence of inhibitors, and sufficient rRNA to be   detected.      TREPAB Negative 2017    HGB 10.2 (L) 2017       GBS Status:   Lab Results   Component Value Date    GBS  2017     Negative  No GBS DNA detected, presumed negative for GBS or number of bacteria may be   below the limit of detection of the assay.   Assay performed on incubated broth culture of specimen using [employer] real-time    PCR.         Ultrasounds  2017:  Impression:    Growth and anatomy survey appears normal.  Powers IUP with growth at 19w 0d (+/- 7-10 days) which is consistent with early ultrasound dating, EDC 17.      OBHX:   Obstetric History       T0      L0     SAB0   TAB0   Ectopic0   Multiple0   Live Births0       # Outcome Date GA Lbr Aiden/2nd Weight Sex Delivery Anes PTL Lv   1 Current                   MedicalHX:   Past Medical History:   Diagnosis Date     Migraine without status migrainosus, not intractable     Migraine     Mild intermittent asthma, uncomplicated     Asthma       SurgicalHX:   Past Surgical History:   Procedure Laterality Date     APPENDECTOMY       TONSILLECTOMY         Medications:     No current facility-administered medications on file prior to encounter.   Current Outpatient Prescriptions on File Prior to Encounter:  hydrOXYzine (ATARAX) 25 MG tablet Take 2-4 tablets ( mg) by mouth nightly as needed for anxiety (sleep and pain)   oxyCODONE-acetaminophen (PERCOCET) 5-325 MG per tablet Take 1-2 tablets by mouth every 4 hours as needed for moderate to severe pain   ferrous gluconate (FERGON) 324 (38 FE) MG tablet Take 1 tablet (324 mg) by mouth 3 times daily (with meals)   oxyCODONE-acetaminophen (PERCOCET) 5-325 MG per tablet Take 1-2 tablets by mouth every 6 hours as needed for pain maximum 8 tablet(s) per day   Acetaminophen (TYLENOL PO) Take 500 mg by mouth every 4 hours as needed for mild pain or fever   Prenatal Vit-Fe Fumarate-FA (PRENATAL MULTIVITAMIN  PLUS IRON) 27-0.8 MG TABS per tablet Take 1 tablet by mouth daily   albuterol (PROAIR HFA/PROVENTIL HFA/VENTOLIN HFA) 108 (90 BASE) MCG/ACT Inhaler Inhale 2 puffs into the lungs every 6 hours as needed for shortness of breath / dyspnea or wheezing Reported on 3/15/2017       Allergies:  No Known Allergies    FamilyHX:  Family History   Problem Relation Age of Onset     Coronary Artery Disease Maternal  Grandmother      Hypertension Maternal Grandmother      Hypertension Mother      Anemia Mother      DIABETES Father      DIABETES Paternal Grandfather      Other Cancer Paternal Grandfather      DIABETES Paternal Uncle        SocialHX:   Social History     Social History     Marital status: Single     Spouse name: N/A     Number of children: N/A     Years of education: N/A     Social History Main Topics     Smoking status: Never Smoker     Smokeless tobacco: None     Alcohol use No     Drug use: No     Sexual activity: Not Asked     Other Topics Concern     None     Social History Narrative       ROS:   Complete 10-point ROS negative except as noted in HPI.She denies headache, blurry vision, chest pain, shortness of breath, RUQ pain, nausea, vomiting, dysuria, hematuria or extremity edema.    PE:  Vit: Patient Vitals for the past 4 hrs:   BP Temp Temp src Resp   06/24/17 1645 133/89 98.4  F (36.9  C) Oral 18      Gen: Well-appearing, NAD, comfortable   CV: RRR, no mrg  Pulm: CTAB, no wheezes or crackles. Normal respiratory effort.  Abd: Soft, gravid, non-tender  Ext: trace LE edema b/l  Cx: Deferred    Presentation:  cephalic by [place]  Membranes: intact    FHT: Baseline 140-150ss, moderate variability, accelerations present, decelerations absent   [place]: Uterine irritability noted only    Labs:   Lab Results   Component Value Date    WBC 10.6 06/24/2017     Lab Results   Component Value Date    RBC 4.10 06/24/2017     Lab Results   Component Value Date    HGB 10.2 06/24/2017     Lab Results   Component Value Date    HCT 31.9 06/24/2017     No components found for: MCT  Lab Results   Component Value Date    MCV 78 06/24/2017     Lab Results   Component Value Date    MCH 24.9 06/24/2017     Lab Results   Component Value Date    MCHC 32.0 06/24/2017     Lab Results   Component Value Date    RDW 14.2 06/24/2017     Lab Results   Component Value Date     06/24/2017        Lab Results   Component Value Date     ABO Pending 2017    RH Pending 2017    AS Pending 2017    HEPBANG Nonreactive 2017    CHPCRT  2017     Negative   Negative for C. trachomatis rRNA by transcription mediated amplification.   A negative result by transcription mediated amplification does not preclude the   presence of C. trachomatis infection because results are dependent on proper   and adequate collection, absence of inhibitors, and sufficient rRNA to be   detected.      GCPCRT  2017     Negative   Negative for N. gonorrhoeae rRNA by transcription mediated amplification.   A negative result by transcription mediated amplification does not preclude the   presence of N. gonorrhoeae infection because results are dependent on proper   and adequate collection, absence of inhibitors, and sufficient rRNA to be   detected.      TREPAB Negative 2017    HGB 10.2 (L) 2017       GBS Status:   Lab Results   Component Value Date    GBS  2017     Negative  No GBS DNA detected, presumed negative for GBS or number of bacteria may be   below the limit of detection of the assay.   Assay performed on incubated broth culture of specimen using [employer] real-time   PCR.         No results found for: PAP    Assessment: 22 year old  at 38w3d by 19w US, who presented to triage for decreased fetal movement and found to have Cat II FHT given minimal variability and nonreactive tracing despite intrauterine resuscitation.  She was recommended to undergo a primary  section for Cat II FHT remote from delivery.    Plan:  - Admit to labor and delivery for urgent primary  section for Cat II FHT remote from delivery:   - Consent obtained: The risks, benefits, and alternatives of  section were discussed, including the risks of bleeding, infection, injury to surrounding organs, fetal injury, and remote risks of hysterectomy. She consented to a blood transfusion in the event of a life threatening amount of  bleeding. She had time to ask questions and agreed to proceed. Surgical consent was signed.   - Labs: CBC, T&S   - Fen/GI: NPO, IVFs, [place].   - Pain: Spinal per anesthesia.    - ID: 2g ancef for heena-op antibiotics.   - PPX: SCDs prior to surgery    - FWB:  Cat II tracing, minimal variability - nonreactive despite intrauterine resuscitation    - PNC: Rh pos, Rubella immune, , GBS negatiev, placenta posterior    Staffed with Dr. Valentin.     Sandra Martel MD MPH  OB/GYN PGY3  6/24/2017  6:11 PM          Patient seen and examined by me, agree with above resident note. CNM patient who presented with complaints of no fetal movement since 10pm last night.  She attempted eating drinking and resting today, but still did not feel movement.  Upon arrival the FHT were 145, minimal variability, no accels, and spontaneous variable decels.  No ctx on toco.  She was given IVF bolus and FMO2 without improvement of the FHT.  Given the persistent cat 2 tracing and no fetal movement, decision was made to proceed with c/s.  Informed consent obtained, risks and benefits explained to patient, all questions answered.  To OR.    MIGUEL VALENTIN MD

## 2017-06-24 NOTE — ANESTHESIA PROCEDURE NOTES
Spinal/LP Procedure Note    Spinal Block  Staff:     Anesthesiologist:  CALOS DAMON  Procedure Start/Stop Times:      6/24/2017 6:27 AM     6/24/2017 6:29 AM    patient identified, IV checked, site marked, risks and benefits discussed, informed consent, monitors and equipment checked, pre-op evaluation, at physician/surgeon's request and post-op pain management      Correct Patient: Yes      Correct Position: Yes      Correct Site: Yes      Correct Procedure: Yes      Correct Laterality:  Yes    Site Marked:  Yes  Procedure:     Procedure:  Intrathecal    Position:  Sitting    Sterile Prep: chloraprep, alcohol, mask and sterile gloves      Insertion site:  L3-4    Approach:  Midline    Needle Type:  Gilberto    Needle gauge (G):  25    Local Skin Infiltration:  1% lidocaine    amount (ml):  3    Needle Length (in):  3.5    Introducer used: Yes      Introducer gauge:  20 G    Attempts:  2    CSF:  Clear    Paresthesias:  No    Time injected:  06:29  Assessment/Narrative:      Patient initially sitting up on bed with feet forward, unable to position well due to pain.  Attempted placement x1, changed to sitting on side of the bed, placed on first attempt.

## 2017-06-24 NOTE — H&P
Julieth Connelly is a 22 year old female,  -American,   single,     Patient's LMP from OB Dating Form was 9/10/2016., Estimated Date of Delivery: 2017 is calculated from lmp and verified with U/S     Pt is seen in the [facility] on 2017 at 5:17 PM Pt called with c/o decreased fetal movement and then none today.  Pt was instructed to come to the Birthplace for monitoring.  States a few cramps yesterday.   .  Membranes are intact    HPI: Since arriving at the birthplace FHTs 154 to 155 minimal variability, has had 2 spontaneous decels, variable in shape, first one to 90, second one to 120.  No discreet contractions noted on toco and pt denies cramping or discomfort.    PRENATAL COURSE  See prenatal  Prenatal course was   complicated by    Patient Active Problem List    Diagnosis Date Noted     Tension headache 2017     Priority: Medium     Had prior to pregnancy.  Used ibuprofen with good relief.       Encounter for triage in pregnant patient 2017     Priority: Medium     Iron deficiency anemia secondary to inadequate dietary iron intake 2017     Priority: Medium     PNV daily.  Will not restart Fe due to constipation issues that are not occuring with PNV only.    JE       Need for Tdap vaccination 2017     Priority: Medium     Done:  17       Encounter for supervision of normal first pregnancy in second trimester 2017     Priority: Medium     Works as  in .  17 [facility] U/S;  19 0/7 weeks, nl fetal survey, posterior placenta, GLEN 17       Mild intermittent asthma without complication 2017     Priority: Medium     Chronic constipation 2017     Priority: Medium       HISTORIES  No Known Allergies  Past Medical History:   Diagnosis Date     Migraine without status migrainosus, not intractable     Migraine     Mild intermittent asthma, uncomplicated     Asthma     Past Surgical History:   Procedure Laterality Date      APPENDECTOMY  2015     TONSILLECTOMY  2013     Family History   Problem Relation Age of Onset     Coronary Artery Disease Maternal Grandmother      Hypertension Maternal Grandmother      Hypertension Mother      Anemia Mother      DIABETES Father      DIABETES Paternal Grandfather      Other Cancer Paternal Grandfather      DIABETES Paternal Uncle      Social History   Substance Use Topics     Smoking status: Never Smoker     Smokeless tobacco: Not on file     Alcohol use No     Obstetric History       T0      L0     SAB0   TAB0   Ectopic0   Multiple0   Live Births0       # Outcome Date GA Lbr Aiden/2nd Weight Sex Delivery Anes PTL Lv   1 Current                   LABS:       Lab Results   Component Value Date    ABO O 2017       Lab Results   Component Value Date    RH  Pos 2017     Rhogam not indicated  Rubella immune  Treponema Pallidum Antibody  Negative    HIV    Non-reactive   Lab Results   Component Value Date    HGB 9.9 2017      Lab Results   Component Value Date    HEPBANG Nonreactive 2017     Lab Results   Component Value Date    GBS  2017     Negative  No GBS DNA detected, presumed negative for GBS or number of bacteria may be   below the limit of detection of the assay.   Assay performed on incubated broth culture of specimen using [employer] real-time   PCR.       other     ROS  Pt denies significant constitutional symptoms including fever and/or malaise.  Pt denies significant respiratory, cardiovacular, GI, or muscular/skeletal complaints.      PHYSICAL EXAM:  There were no vitals taken for this visit.  General appearance healthy, alert, active and no distress   Neuro:  denies headache and visual disturbances  Psych: Mentation normal and bright   Legs: 2+/2+, no clonus, no edema       Abdomen: gravid, single vertex fetus, non-tender, EFW 6 lbs 10 oz. Pt is not richard    FETAL HEART TONES: baseline 155 with minimal variability, 2 spont decels as noted above.      PELVIC EXAM: closed/ 30%/ -3/ posterior  BLOODY SHOW:: no  Membranes as listed above    ASSESSMENT:  IUP @ 38w3d  wks gestation  For observation for no fetal movement today  NST  non-reactive   Parity: Primip  GBS negative and membranes intact      PLAN:  Will plan to observe   IV and labs  If baby does not increase it's variability will consult with Dr Fong about a delivery plan    Maritza Ball CNM

## 2017-06-24 NOTE — TELEPHONE ENCOUNTER
Caller states that she has noticed no fetal movement since last night, she has mild abdominal cramping, no vaginal bleeding or discharge. Paged on call Midwife Maritza Ball Call pt Julieth Connelly, [phone],  1995, MRN [id number], 38 weeks pregnant and no fetal movement since last night, mild abdominal cramps, no vaginal discharge. Thanks!.  Reason for Disposition    [1] Pregnant 23 or more weeks AND [2] No movement of baby for 8 hours    Additional Information    Negative: Sounds like a life-threatening emergency to the triager    Negative: Injury to abdomen    Negative: [1] Pregnant > 36 weeks AND [2] having contractions or other symptoms of labor    Negative: [1] Pregnant < 37 weeks AND [2] having contractions or other symptoms of labor    Negative: [1] Pregnant > 20 weeks AND [2] abdominal pain    Negative: [1] Pregnant > 20 weeks AND [2] vaginal bleeding or spotting    Negative: Blurred vision or visual change    Negative: [1] SEVERE headache AND [2] not relieved with acetaminophen (e.g., Tylenol)    Negative: Leakage of fluid from vagina    Negative: [1] Pregnant 23 or more weeks AND [2] baby moving less today by kick count  (e.g., kick count < 5 in 1 hour or < 10 in 2 hours)    Negative: [1] Pregnant 23 or more weeks AND [2] baby moving less today AND [3] unable or unwilling to perform kick count    Negative: [1] Pregnant 23 or more weeks AND [2] normal kick count BUT [3] mother still thinks there is something wrong    Protocols used: PREGNANCY - DECREASED FETAL MOVEMENT-ADULT-    Edith Torres, RN, BSN  [facility]

## 2017-06-24 NOTE — TELEPHONE ENCOUNTER
3:22 p.m. Paged CNARTUR Ball to call patient again. She tried everything that was recommended and she still can't feel the baby moving.

## 2017-06-24 NOTE — IP AVS SNAPSHOT
MRN:[id number]                      After Visit Summary   2017    Julieth Connelly    MRN: [id number]           Thank you!     Thank you for choosing [facility] for your care. Our goal is always to provide you with excellent care. Hearing back from our patients is one way we can continue to improve our services. Please take a few minutes to complete the written survey that you may receive in the mail after you visit with us. Thank you!        Patient Information     Date Of Birth          1995        Designated Caregiver       Most Recent Value    Caregiver    Will someone help with your care after discharge? no      About your hospital stay     You were admitted on:  2017 You last received care in the:  [facility]    You were discharged on:  2017       Who to Call     For medical emergencies, please call 911.  For non-urgent questions about your medical care, please call your primary care provider or clinic, [phone]  For questions related to your surgery, please call your surgery clinic        Attending Provider     Provider Specialty    Maritza Ball APRN CNM OB/Gyn    Maritza Valentin MD OB/Gyn       Primary Care Provider Office Phone # Fax #    Alethea Schmid [phone]      After Care Instructions     Activity       Review discharge instructions            Diet       Resume previous diet            Discharge Instructions - Postpartum visit       Follow up for an incision check in 1 week and a routine postpartum visit in 6 weeks.    Activity Instructions:   - Vaginal delivery: Nothing in the vagina for 6 weeks, no intercourse for 6 weeks, you are not restricted on other activities, but rest for 1-2 weeks to allow your body to recover from delivery. No driving until you have stopped taking your pain medications.  -  delivery: No lifting more than 15 pounds for 6 weeks.  Nothing in the vagina for 6 weeks, no intercourse for 6  weeks. No strenuous exercise for 6 weeks. No driving until you have stopped taking your pain medications.    Call your health care provider if you have any of the following: Fever above 100.4 F; opening or drainage from your incision; soaking a sanitary pad with blood within 1 hour, or you see blood clots larger than a golf ball; malodorous vaginal discharge, severe or worsening pain uncontrolled by your pain medications, nausea and vomiting, severe headaches, changes in vision, calf swelling or pain, shortness of breath, problems coping with sadness, anxiety, or depression.  If you have any concerns about hurting yourself or the baby, call your provider immediately. You are encouraged to call with questions or concerns after you return home.                  Further instructions from your care team       Postop  Birth Instructions    Activity       Do not lift more than 10 pounds for 6 weeks after surgery.  Ask family and friends for help when you need it.    No driving until you have stopped taking your pain medications (usually two weeks after surgery).    No heavy exercise or activity for 6 weeks.  Don't do anything that will put a strain on your surgery site.    Don't strain when using the toilet.  Your care team may prescribe a stool softener if you have problems with your bowel movements.     To care for your incision:       Keep the incision clean and dry.    Do not soak your incision in water. No swimming or hot tubs until it has fully healed. You may soak in the bathtub if the water level is below your incision.    Do not use peroxide, gel, cream, lotion, or ointment on your incision.    Adjust your clothes to avoid pressure on your surgery site (check the elastic in your underwear for example).     You may see a small amount of clear or pink drainage and this is normal.  Check with your health care provider:       If the drainage increases or has an odor.    If the incision reddens, you have  swelling, or develop a rash.    If you have increased pain and the medicine we prescribed doesn't help.    If you have a fever above 100.4 F (38 C) with or without chills when placing thermometer under your tongue.   The area around your incision (surgery wound), will feel numb.  This is normal. The numbness should go away in less than a year.     Keep your hands clean:  Always wash your hands before touching your incision (surgery wound). This helps reduce your risk of infection. If your hands aren't dirty, you may use an alcohol hand-rub to clean your hands. Keep your nails clean and short.    Call your healthcare provider if you have any of these symptoms:       You soak a sanitary pad with blood within 1 hour, or you see blood clots larger than a golf ball.    Bleeding that lasts more than 6 weeks.    Vaginal discharge that smells bad.    Severe pain, cramping or tenderness in your lower belly area.    A need to urinate more frequently (use the toilet more often), more urgently (use the toilet very quickly), or it burns when you urinate.    Nausea and vomiting.    Redness, swelling or pain around a vein in your leg.    Problems breastfeeding or a red or painful area on your breast.    Chest pain and cough or are gasping for air.    Problems with coping with sadness, anxiety or depression. If you have concerns about hurting yourself or the baby, call your provider immediately.      You have questions or concerns after you return home.                  Pending Results     No orders found from 6/22/2017 to 6/25/2017.            Statement of Approval     Ordered          06/27/17 0903  I have reviewed and agree with all the recommendations and orders detailed in this document.  EFFECTIVE NOW     Approved and electronically signed by:  Reema Broussard MD             Admission Information     Date & Time Provider Department Dept. Phone    6/24/2017 Maritza Valentin MD [facility] [phone]      Your Vitals  "Were     Blood Pressure Temperature Respirations Pulse Oximetry          130/77 97.5  F (36.4  C) (Oral) 16 98%        MyChart Information     [employer] lets you send messages to your doctor, view your test results, renew your prescriptions, schedule appointments and more. To sign up, go to [email or web address] . Click on \"Log in\" on the left side of the screen, which will take you to the Welcome page. Then click on \"Sign up Now\" on the right side of the page.     You will be asked to enter the access code listed below, as well as some personal information. Please follow the directions to create your username and password.     Your access code is: [email or web address]  Expires: 2017  2:51 PM     Your access code will  in 90 days. If you need help or a new code, please call your [facility] clinic or [phone].        Care EveryWhere ID     This is your Care EveryWhere ID. This could be used by other organizations to access your [facility] medical records  [id number]        Equal Access to Services     [facility] : Hadii jana Marley, waaxda lujoseph, qaybta kaalmaterry hoyos, chuck corona . So [facility] [phone].    ATENCIÓN: Si habla español, tiene a morel disposición servicios gratuitos de asistencia lingüística. Lorenzo al [phone].    We comply with applicable federal civil rights laws and [place] laws. We do not discriminate on the basis of race, color, national origin, age, disability sex, sexual orientation or gender identity.               Review of your medicines      START taking        Dose / Directions    ibuprofen 600 MG tablet   Commonly known as:  ADVIL/MOTRIN        Dose:  600 mg   Take 1 tablet (600 mg) by mouth every 6 hours as needed for moderate pain   Quantity:  30 tablet   Refills:  1       senna-docusate 8.6-50 MG per tablet   Commonly known as:  SENOKOT-S;PERICOLACE        Dose:  1-2 tablet   Take 1-2 tablets by mouth 2 times daily as needed for " constipation   Quantity:  30 tablet   Refills:  0         CONTINUE these medicines which may have CHANGED, or have new prescriptions. If we are uncertain of the size of tablets/capsules you have at home, strength may be listed as something that might have changed.        Dose / Directions    oxyCODONE-acetaminophen 5-325 MG per tablet   Commonly known as:  PERCOCET   This may have changed:    - when to take this  - additional instructions  - Another medication with the same name was removed. Continue taking this medication, and follow the directions you see here.        Dose:  1-2 tablet   Take 1-2 tablets by mouth every 4 hours as needed for pain   Quantity:  30 tablet   Refills:  0         CONTINUE these medicines which have NOT CHANGED        Dose / Directions    albuterol 108 (90 BASE) MCG/ACT Inhaler   Commonly known as:  PROAIR HFA/PROVENTIL HFA/VENTOLIN HFA        Dose:  2 puff   Inhale 2 puffs into the lungs every 6 hours as needed for shortness of breath / dyspnea or wheezing Reported on 3/15/2017   Quantity:  3 Inhaler   Refills:  1       ferrous gluconate 324 (38 FE) MG tablet   Commonly known as:  FERGON   Used for:  Iron deficiency anemia secondary to inadequate dietary iron intake, Encounter for supervision of normal first pregnancy in second trimester        Dose:  324 mg   Take 1 tablet (324 mg) by mouth 3 times daily (with meals)   Quantity:  60 tablet   Refills:  0       hydrOXYzine 25 MG tablet   Commonly known as:  ATARAX   Used for:  Tension headache        Dose:   mg   Take 2-4 tablets ( mg) by mouth nightly as needed for anxiety (sleep and pain)   Quantity:  20 tablet   Refills:  0       prenatal multivitamin  plus iron 27-0.8 MG Tabs per tablet   Used for:  Mild intermittent asthma without complication        Dose:  1 tablet   Take 1 tablet by mouth daily   Quantity:  100 tablet   Refills:  3       TYLENOL PO   Indication:  Pain        Dose:  500 mg   Take 500 mg by mouth every 4  hours as needed for mild pain or fever   Refills:  0            Where to get your medicines      These medications were sent to [facility] [place], [place] - [address]  [address], [facility] [address]     Phone:  [phone]     ibuprofen 600 MG tablet    senna-docusate 8.6-50 MG per tablet         Some of these will need a paper prescription and others can be bought over the counter. Ask your nurse if you have questions.     Bring a paper prescription for each of these medications     oxyCODONE-acetaminophen 5-325 MG per tablet                Protect others around you: Learn how to safely use, store and throw away your medicines at www.disposemymeds.org.             Medication List: This is a list of all your medications and when to take them. Check marks below indicate your daily home schedule. Keep this list as a reference.      Medications           Morning Afternoon Evening Bedtime As Needed    albuterol 108 (90 BASE) MCG/ACT Inhaler   Commonly known as:  PROAIR HFA/PROVENTIL HFA/VENTOLIN HFA   Inhale 2 puffs into the lungs every 6 hours as needed for shortness of breath / dyspnea or wheezing Reported on 3/15/2017                                ferrous gluconate 324 (38 FE) MG tablet   Commonly known as:  FERGON   Take 1 tablet (324 mg) by mouth 3 times daily (with meals)                                hydrOXYzine 25 MG tablet   Commonly known as:  ATARAX   Take 2-4 tablets ( mg) by mouth nightly as needed for anxiety (sleep and pain)                                ibuprofen 600 MG tablet   Commonly known as:  ADVIL/MOTRIN   Take 1 tablet (600 mg) by mouth every 6 hours as needed for moderate pain   Last time this was given:  800 mg on 6/27/2017 11:06 AM                                oxyCODONE-acetaminophen 5-325 MG per tablet   Commonly known as:  PERCOCET   Take 1-2 tablets by mouth every 4 hours as needed for pain                                prenatal  multivitamin  plus iron 27-0.8 MG Tabs per tablet   Take 1 tablet by mouth daily                                senna-docusate 8.6-50 MG per tablet   Commonly known as:  SENOKOT-S;PERICOLACE   Take 1-2 tablets by mouth 2 times daily as needed for constipation   Last time this was given:  2 tablets on 6/27/2017 11:06 AM                                TYLENOL PO   Take 500 mg by mouth every 4 hours as needed for mild pain or fever   Last time this was given:  975 mg on 6/27/2017 11:06 AM

## 2017-06-24 NOTE — IP AVS SNAPSHOT
[facility]    [address] [facility] [address]    Phone:  [phone]                                       After Visit Summary   6/24/2017    Julieth Connelly    MRN: [id number]           After Visit Summary Signature Page     I have received my discharge instructions, and my questions have been answered. I have discussed any challenges I see with this plan with the nurse or doctor.    ..........................................................................................................................................  Patient/Patient Representative Signature      ..........................................................................................................................................  Patient Representative Print Name and Relationship to Patient    ..................................................               ................................................  Date                                            Time    ..........................................................................................................................................  Reviewed by Signature/Title    ...................................................              ..............................................  Date                                                            Time

## 2017-06-24 NOTE — ANESTHESIA PREPROCEDURE EVALUATION
Physical Exam  Normal systems: cardiovascular, pulmonary and dental    Airway   Mallampati: II  TM distance: >3 FB  Neck ROM: full    Dental     Cardiovascular       Pulmonary                     Anesthesia Plan      History & Physical Review  History and physical reviewed and following examination; no interval change.    ASA Status:  3 emergent.    NPO Status:  > 6 hours    Plan for Spinal   PONV prophylaxis:  Ondansetron (or other 5HT-3)  21 yo  who is 38w 3 days gestation, healthy, who presented for decreased fetal movement and was found to have Category II FHT tracing.            Postoperative Care  Postoperative pain management:  Neuraxial analgesia, Multi-modal analgesia and Peripheral nerve block (Single Shot).      Consents  Anesthetic plan, risks, benefits and alternatives discussed with:  Patient.  Use of blood products discussed: Yes.   Use of blood products discussed with Patient.  Consented to blood products.  .

## 2017-06-24 NOTE — PROVIDER NOTIFICATION
17 1700   Provider Notification   Provider Name/Title [facility]   Method of Notification Electronic Page   Request Evaluate in Person   Notification Reason Patient Arrived;Decels;Variability Change   Pt  at 38.3wk. Here with decreased fetal movement. No movement today at all. Baby had a decel when monitor applied with minimal variability. Await evaluation. Continue close monitoring.

## 2017-06-25 LAB
HGB BLD-MCNC: 8.8 G/DL (ref 11.7–15.7)
T PALLIDUM IGG+IGM SER QL: NEGATIVE

## 2017-06-25 PROCEDURE — 36415 COLL VENOUS BLD VENIPUNCTURE: CPT | Performed by: ADVANCED PRACTICE MIDWIFE

## 2017-06-25 PROCEDURE — 85018 HEMOGLOBIN: CPT | Performed by: ADVANCED PRACTICE MIDWIFE

## 2017-06-25 PROCEDURE — 12000032 ZZH R&B OB CRITICAL UMMC

## 2017-06-25 PROCEDURE — 25000132 ZZH RX MED GY IP 250 OP 250 PS 637: Performed by: OBSTETRICS & GYNECOLOGY

## 2017-06-25 PROCEDURE — 25000128 H RX IP 250 OP 636: Performed by: OBSTETRICS & GYNECOLOGY

## 2017-06-25 RX ADMIN — ACETAMINOPHEN 975 MG: 325 TABLET, FILM COATED ORAL at 17:27

## 2017-06-25 RX ADMIN — SENNOSIDES AND DOCUSATE SODIUM 2 TABLET: 8.6; 5 TABLET ORAL at 19:36

## 2017-06-25 RX ADMIN — SODIUM CHLORIDE, SODIUM LACTATE, POTASSIUM CHLORIDE, CALCIUM CHLORIDE AND DEXTROSE MONOHYDRATE: 5; 600; 310; 30; 20 INJECTION, SOLUTION INTRAVENOUS at 03:37

## 2017-06-25 RX ADMIN — SENNOSIDES AND DOCUSATE SODIUM 2 TABLET: 8.6; 5 TABLET ORAL at 08:42

## 2017-06-25 RX ADMIN — OXYCODONE HYDROCHLORIDE 10 MG: 5 TABLET ORAL at 19:36

## 2017-06-25 RX ADMIN — KETOROLAC TROMETHAMINE 30 MG: 30 INJECTION, SOLUTION INTRAMUSCULAR at 07:08

## 2017-06-25 RX ADMIN — ACETAMINOPHEN 975 MG: 325 TABLET, FILM COATED ORAL at 08:42

## 2017-06-25 RX ADMIN — OXYCODONE HYDROCHLORIDE 10 MG: 5 TABLET ORAL at 22:23

## 2017-06-25 RX ADMIN — ACETAMINOPHEN 975 MG: 325 TABLET, FILM COATED ORAL at 01:10

## 2017-06-25 RX ADMIN — KETOROLAC TROMETHAMINE 30 MG: 30 INJECTION, SOLUTION INTRAMUSCULAR at 01:10

## 2017-06-25 RX ADMIN — IBUPROFEN 800 MG: 400 TABLET ORAL at 12:26

## 2017-06-25 RX ADMIN — OXYCODONE HYDROCHLORIDE 10 MG: 5 TABLET ORAL at 16:26

## 2017-06-25 RX ADMIN — OXYCODONE HYDROCHLORIDE 10 MG: 5 TABLET ORAL at 13:34

## 2017-06-25 RX ADMIN — IBUPROFEN 800 MG: 400 TABLET ORAL at 18:49

## 2017-06-25 NOTE — OR NURSING
Pt to PACU via cart.  VSS, temp 98.2, 20 units of Pitocin infusing by gravity to piv without complications, vera to gravity with clear colored urine. denies pain and nausea, dressing CDI.  I)IV to pump, compression to pneumoboots re-started.  A) Stable P) pt to inform RN if she experiences pain or nausea.  Post op cares.   Anticipate transfer to 5 floor post partum at ~1.5 hours post op.

## 2017-06-25 NOTE — PLAN OF CARE
Data: [facility] at 1642.   Reason for maternal/fetal assessment per patient is Decreased Fetal Movement  .  Patient is a . Prenatal record reviewed.      Obstetric History       T0      L0     SAB0   TAB0   Ectopic0   Multiple0   Live Births0       # Outcome Date GA Lbr Aiden/2nd Weight Sex Delivery Anes PTL Lv   1 Current               . Medical history:   Past Medical History:   Diagnosis Date     Migraine without status migrainosus, not intractable     Migraine     Mild intermittent asthma, uncomplicated     Asthma   . Gestational Age 38w3d. VSS. Fetal movement decreased x 1 week, no movement today. Patient denies cramping, backache, vaginal discharge, pelvic pressure, UTI symptoms, GI problems, bloody show, vaginal bleeding, edema, headache, visual disturbances, epigastric or URQ pain, abdominal pain, rupture of membranes.   Action: Verbal consent for EFM. Triage assessment completed. EFM applied for fetal assessment. Uterine assessment occasional. Fetal assessment: Presumed adequate fetal oxygenation documented (see flow record).   Response: [facility]ARM informed of pt arrival and FHR strip. Plan per provider is start IV, lab draw, proceed to C/S. Patient verbalized agreement with plan.

## 2017-06-25 NOTE — OP NOTE
Section Operative Report    Surgery Date:             2017     Pre-op Diagnosis:                   1. Intrauterine pregnancy at 38w3d                                                 2. Cat II FHT remote from delivery      Post-op Diagnosis:                 1. Same                                               2. Liveborn female infant      Procedure:                  Primary low-transverse  section with double layer uterine closure via Pfannenstiel incision     Surgeon:                     Dr. Valentin  Assistants:                 Sandra Martel MD MPH, PGY-3                                   Maritza Raymundo, MS3     Anesthesia:                Spinal, TAP blocks     EBL:               1000 mL  IVF:                 1200 mL crystalloid  UOP:              125 mL clear urine at the end of the case  Drains: Bradshaw Catheter      Findings:   1. Liveborn female infant in ROP presentation delivered at 1846 on 2017. Apgars 8 at 1 minute & 9 at 5 minutes. Weight 2466g.  2.  Venous cord pH 7.35, base deficit 0.6.  3. Clear amniotic fluid  4. Placenta small but intact and with 3-vessel cord.   5. No intrabdominal adhesions.  No fascial adhesions.    6. Normal uterus with arcuate appearance, fallopian tubes, and ovaries.      Specimens:                cord segment, cord blood     Complications:                       None apparent  Indications:  Julieth Connelly is a 22 year old  at 38w3d who presented initially presented to the midwife service for decreased fetal movements found to have a Cat II FHT given minimal variability and nonreactive tracing.  She underwent intrauterine resuscitation efforts without success.  She was recommended to undergo a primary  section for Cat II FHT remote from delivery.  She was counseled on the risks and benefits of the procedure and agreed to proceed.  She signed written consent at bedside.     Procedures:  The patient was taken to the operating room  where spinal anesthesia was placed and found to be adequate. 2 gms of Ancef was given by anesthesia. SCDs and a vera catheter were placed.  The patient was then prepared and draped in the normal sterile fashion in the dorsal supine position with a leftward tilt.  A safety timeout was performed. A Pfannenstiel skin incision was made with the scalpel and carried through to the underlying layer of fascia. The fascia was incised in the midline and the incision extended laterally with Joshua scissors. The superior aspect of the fascial incision was then grasped with Kocher clamps, elevated, and the underlying rectus muscles dissected off bluntly and sharply using Joshua scissors. Attention was then turned to the inferior aspect of this incision which, in a similar fashion, was grasped, tented up with Kocher clamps, and the rectus muscle dissected off bluntly and sharply. The rectus muscles were then  in the midline, and the peritoneum entered bluntly. The peritoneum was extended using sharp and blunt dissection. The bladder blade was inserted.  The vesicouterine peritoneum identified, grasped with the pick-ups, and entered sharply with the Metzenbaum scissors. The incision was then extended laterally and the bladder flap created digitally. The bladder blade was then reinserted and the lower uterine segment incised in a transverse fashion with the scalpel. The uterine incision was then extended bluntly in a cranial caudad fashion. The bladder blade was removed and the infant's head delivered atraumatically with fundal pressure from above. The cord was clamped and cut in a delayed fashion.  The infant was handed off to the waiting NICU nurse. A segment of cord was cut and clamped and held for for cord gases and blood.    The placenta was then removed with traction on the umbilical cord. The uterus was exteriorized and cleared of all clots and debris with a wet lap sponge.  20 units of IV pitocin was given by  anesthesia with good uterine tone.  The uterine incision was repaired with 0-vicryl in a running, locked fashion. 0-Monocryl was used for a second imbricating layer.  The posterior cul-de-sac was suctioned to clear all clot and debris.  The uterus was placed back into the abdominal cavity and found to have excellent hemostasis following electrocautery for several minor oozing points on the bladder flap edge. The pericolic gutters were cleared of all clots and debries using suction. Intercede adhesive barrier was placed over the lower uterus segment and Seprafilm was placed over the rectus muscles.  The abdominal wall was inspected and noted to be hemostatic. The fascia was reapproximated with 0-vicryl in a running suture. The subcutaneous layer was irrigated and areas of bleeding were cauterized.  The skin was closed with 4-0 moncryl. Steri strips and an ABD pad were used for dressing. Fundus was firm at the end of case.     The patient tolerated the procedure well. Sponge, lap, and needle counts were correct x2. The patient was taken to the recovery room in stable condition.      Dr. Valentin was scrubbed and present for the entire procedure.    Sandra Martel MD MPH  OB/GYN PGY2  6/24/2017  8:10 PM    I was scrubbed and present for entire procedure, agree with above operative note.    MIGUEL VALENTIN MD

## 2017-06-25 NOTE — PLAN OF CARE
Problem: Goal Outcome Summary  Goal: Goal Outcome Summary  Outcome: Improving  Pt arrived to room 7122 at 2230 via cart with infant in arms and accompanied by friend.  Oriented to room and unit.  VSS and postpartum assessments WDL.  Bonding well with infant.  Breastfeeding on cue with assist for positioning and latching.  Pt denies pain.  PIV in right hand infusing 2nd bag of pitocin at 100ml/hr.  Sister and friend present and supportive.  Will continue with postpartum cares and education per plan of care.

## 2017-06-25 NOTE — ANESTHESIA PROCEDURE NOTES
Peripheral Nerve Block Procedure Note    Staff:     Anesthesiologist:  CALOS DAMON  Location: OR AFTER induction  Procedure Start/Stop TImes:      6/24/2017 7:35 PM     6/24/2017 7:40 PM    patient identified, IV checked, site marked, risks and benefits discussed, informed consent, monitors and equipment checked, pre-op evaluation, at physician/surgeon's request and post-op pain management      Correct Patient: Yes      Correct Position: Yes      Correct Site: Yes      Correct Procedure: Yes      Correct Laterality:  Yes    Site Marked:  Yes  Procedure details:     Procedure:  TAP    Laterality:  Bilateral    Position:  Supine    Sterile Prep: chloraprep, alcohol swabs, mask and sterile gloves      Local skin infiltration:  None    Needle:  Insulated and short bevel    Needle gauge:  21    Needle length (inches):  4    Ultrasound: Yes      Ultrasound used to identify targeted nerve, plexus, or vascular structure and placed a needle adjacent to it      Permanent Image entered into patiient's record      Abnormal pain on injection: No      Blood Aspirated: No      Paresthesias:  No    Bleeding at site: No      Bolus via:  Needle    Infusion Method:  Single Shot    Complications:  None  Assessment/Narrative:     Injection made incrementally with aspirations every (mL):  5

## 2017-06-25 NOTE — PROGRESS NOTES
[facility], [place]    Obstetrics Post-Op / Progress Note    Assessment & Plan   Assessment:  -1 Day Post-Op  Procedure(s):   - Wound Class: II-Clean Contaminated    Doing well.  No excessive bleeding  Pain well-controlled.  Acute on chronic anemia, expected from delivery  Hemoglobin   Date Value Ref Range Status   06/25/2017 8.8 (L) 11.7 - 15.7 g/dL Final   ]      Plan:  Anticipate discharge in 2 days  Routine cares and d/c IV today  Plan Fe at discharge home    LEFTY MOYER     Interval History   Doing well.  Pain is controlled.  No fevers.  No history of wound drainage, warmth or significant erythema.  Good appetite.  Denies chest pain, shortness of breath, nausea or vomiting.  Breastfeeding well. Has not really ambulated much yet.    Medications     oxytocin in 0.9% NaCl Stopped (06/25/17 0337)     dextrose 5% lactated ringers 125 mL/hr at 06/25/17 0337     oxytocin in 0.9% NaCl       NO Rho (D) immune globulin (RhoGam) needed - mother Rh POSITIVE       - MEDICATION INSTRUCTIONS -       - MEDICATION INSTRUCTIONS -         sodium chloride (PF)  3 mL Intracatheter Q8H     senna-docusate  1-2 tablet Oral BID     acetaminophen  975 mg Oral Q8H     ketorolac  30 mg Intravenous Q6H     oxytocin in 0.9% NaCl           Physical Exam   Temp: 98  F (36.7  C) Temp src: Oral BP: 123/69   Heart Rate: 90 Resp: 16 SpO2: 99 % O2 Device: None (Room air)    There were no vitals filed for this visit.  Vital Signs with Ranges  Temp:  [97.5  F (36.4  C)-98.4  F (36.9  C)] 98  F (36.7  C)  Heart Rate:  [] 90  Resp:  [0-33] 16  BP: (113-146)/() 123/69  SpO2:  [97 %-100 %] 99 %  I/O last 3 completed shifts:  In: 3500 [P.O.:2100; I.V.:500; IV Piggyback:900]  Out: 700 [Urine:700]    Uterine fundus is firm, non-tender and at the level of the umbilicus  Incision C/D/I    Data   Recent Labs   Lab Test  06/24/17   1741   ABO  O   RH   Pos   AS  Neg     Recent Labs   Lab Test  06/25/17   0755   06/24/17   1741   HGB  8.8*  10.2*     Recent Labs   Lab Test  01/11/17   1030   RUQIGG  18

## 2017-06-25 NOTE — PROVIDER NOTIFICATION
06/24/17 2235   Provider Notification   Provider Name/Title Dr. Martel   Method of Notification Electronic Page   Notification Reason Other   Pt just moved up from PACU, incision dressing slightly peeled off and bleeding noted on the incision. Would you like to come evaluate?

## 2017-06-25 NOTE — PLAN OF CARE
Problem: Goal Outcome Summary  Goal: Goal Outcome Summary  Outcome: Improving  Delivery Note  Primary C/S of viable Female with Dr. Valentin, Dr. Martel, Raeann Kwok, RN in attendance.  NICU and Nursery RN Manjula MOJICA present.  Infant with spontaneous cry, to warmer for NICU assessment.  APGAR at 1 minute:  8 and APGAR at 5 minutes:  9.  Incision closed, TAP block done, heena cares provided.  Mother and baby in stable condition. Transferred to PACU for 2 hr recovery.

## 2017-06-25 NOTE — PLAN OF CARE
Problem: Goal Outcome Summary  Goal: Goal Outcome Summary  Outcome: Improving  Pt stable this shift. Ambulating in room. Shower but pressure dressing remains intact at this time. IV and Bradshaw out. Tylenol, ibuprofen and oxycodone for pain relief. Bonding well with infant. Increasingly independent with breast feeding and infant cares.

## 2017-06-25 NOTE — PLAN OF CARE
Problem: Postpartum ( Delivery) (Adult)  Goal: Signs and Symptoms of Listed Potential Problems Will be Absent or Manageable (Postpartum)  Signs and symptoms of listed potential problems will be absent or manageable by discharge/transition of care (reference Postpartum ( Delivery) (Adult) CPG).   Outcome: Improving  Pt's Bradshaw out this morning 0900 and feeling urge to void but unable to void after many attempts. Increasing pain and pressure on abdomen. Bladder scanned for 300 mL at 1400. Straight cath with 300 mL output. Pain medication, hot packs. Pt resting now.

## 2017-06-25 NOTE — ANESTHESIA POSTPROCEDURE EVALUATION
Patient: Julieth Connelly    Procedure(s):   - Wound Class: II-Clean Contaminated    Diagnosis:Pegnant  Diagnosis Additional Information: No value filed.    Anesthesia Type:  No value filed.    Note:  Anesthesia Post Evaluation    Patient location during evaluation: PACU  Patient participation: Able to participate in evaluation but full recovery from regional anesthesia has not yet ocurrred but is anticipated to occur within 48 hours  Level of consciousness: awake and alert  Pain management: adequate  Airway patency: patent  Cardiovascular status: acceptable  Respiratory status: acceptable  Hydration status: acceptable  PONV: none             Last vitals:  Vitals:    06/24/17 1645   BP: 133/89   Resp: 18   Temp: 36.9  C (98.4  F)         Electronically Signed By: Nelly Jin MD  June 24, 2017  7:51 PM

## 2017-06-25 NOTE — PLAN OF CARE
"Problem: Goal Outcome Summary  Goal: Goal Outcome Summary  Outcome: Therapy, progress toward functional goals as expected  Data: Vital signs within normal limits. Postpartum checks within normal limits - see flow record. Patient drinking plenty of fluids. Patient was nauseous upon beginning of shift when she ate applesauce and andrei crackers. Drank ginger ale which helped relieve nausea. Patient denied nausea throughout the night and has been able to eat snacks w/o nausea this AM.  Bradshaw catheter is in place, output adequate. Patient did not sleep overnight, stated \"I know my body is tired but I just can't fall asleep\". Is willing and ready to get up out of bed and ambulate once she has taken a nap. No apparent signs of infection. Incision UTV.  Breastfeeding infant with assistance; infant is tongue thrusting and mother has large breasts. Patient demonstrated hand expression to nurse, patient has lots of colostrum.   Action: Patient medicated during the shift for pain. See MAR. Patient reassessed within 1 hour after each medication and pain was improved - patient stated she was comfortable. Patient education done about hand massage and expression, pain management. See flow record.  Response: Positive attachment behaviors observed with infant. Friend Ankita present and supportive.   Plan: Continue plan of care.       "

## 2017-06-26 PROCEDURE — 12000028 ZZH R&B OB UMMC

## 2017-06-26 PROCEDURE — 25000132 ZZH RX MED GY IP 250 OP 250 PS 637: Performed by: OBSTETRICS & GYNECOLOGY

## 2017-06-26 RX ORDER — OXYCODONE AND ACETAMINOPHEN 5; 325 MG/1; MG/1
1-2 TABLET ORAL EVERY 4 HOURS PRN
Qty: 30 TABLET | Refills: 0 | Status: SHIPPED | OUTPATIENT
Start: 2017-06-26

## 2017-06-26 RX ORDER — IBUPROFEN 600 MG/1
600 TABLET, FILM COATED ORAL EVERY 6 HOURS PRN
Qty: 30 TABLET | Refills: 1 | Status: SHIPPED | OUTPATIENT
Start: 2017-06-26

## 2017-06-26 RX ORDER — AMOXICILLIN 250 MG
1-2 CAPSULE ORAL 2 TIMES DAILY PRN
Qty: 30 TABLET | Refills: 0 | Status: SHIPPED | OUTPATIENT
Start: 2017-06-26

## 2017-06-26 RX ADMIN — SENNOSIDES AND DOCUSATE SODIUM 2 TABLET: 8.6; 5 TABLET ORAL at 08:26

## 2017-06-26 RX ADMIN — IBUPROFEN 800 MG: 400 TABLET ORAL at 20:02

## 2017-06-26 RX ADMIN — ACETAMINOPHEN 975 MG: 325 TABLET, FILM COATED ORAL at 18:13

## 2017-06-26 RX ADMIN — OXYCODONE HYDROCHLORIDE 10 MG: 5 TABLET ORAL at 07:37

## 2017-06-26 RX ADMIN — ACETAMINOPHEN 975 MG: 325 TABLET, FILM COATED ORAL at 09:37

## 2017-06-26 RX ADMIN — IBUPROFEN 800 MG: 400 TABLET ORAL at 07:37

## 2017-06-26 RX ADMIN — SENNOSIDES AND DOCUSATE SODIUM 2 TABLET: 8.6; 5 TABLET ORAL at 20:02

## 2017-06-26 RX ADMIN — IBUPROFEN 800 MG: 400 TABLET ORAL at 14:07

## 2017-06-26 RX ADMIN — OXYCODONE HYDROCHLORIDE 5 MG: 5 TABLET ORAL at 05:44

## 2017-06-26 RX ADMIN — OXYCODONE HYDROCHLORIDE 5 MG: 5 TABLET ORAL at 04:29

## 2017-06-26 RX ADMIN — IBUPROFEN 800 MG: 400 TABLET ORAL at 01:33

## 2017-06-26 RX ADMIN — OXYCODONE HYDROCHLORIDE 5 MG: 5 TABLET ORAL at 14:07

## 2017-06-26 RX ADMIN — OXYCODONE HYDROCHLORIDE 10 MG: 5 TABLET ORAL at 11:15

## 2017-06-26 RX ADMIN — OXYCODONE HYDROCHLORIDE 10 MG: 5 TABLET ORAL at 21:41

## 2017-06-26 RX ADMIN — OXYCODONE HYDROCHLORIDE 10 MG: 5 TABLET ORAL at 18:19

## 2017-06-26 RX ADMIN — OXYCODONE HYDROCHLORIDE 10 MG: 5 TABLET ORAL at 01:33

## 2017-06-26 RX ADMIN — SIMETHICONE CHEW TAB 80 MG 80 MG: 80 TABLET ORAL at 08:26

## 2017-06-26 RX ADMIN — SIMETHICONE CHEW TAB 80 MG 80 MG: 80 TABLET ORAL at 02:04

## 2017-06-26 RX ADMIN — ACETAMINOPHEN 975 MG: 325 TABLET, FILM COATED ORAL at 01:33

## 2017-06-26 NOTE — PLAN OF CARE
Problem: Goal Outcome Summary  Goal: Goal Outcome Summary  Outcome: No Change  Stable postpartum. Incision pain managed with medication. Encouraged pt to ambulate this morning. Also encouraged pt to urinate frequently and before breastfeeding .

## 2017-06-26 NOTE — DISCHARGE SUMMARY
[facility] Discharge Summary    Julieth Connelly MRN# [id number]   Age: 22 year old YOB: 1995     Date of Admission:  2017  Date of Discharge::  17  Admitting Physician:  Maritza Valentin MD  Discharge Physician:  Reema Broussard MD          Admission Diagnoses:   - at 38w3d  -Decreased fetal movement          Discharge Diagnosis:   -, s/p PLTCS  -Acute blood loss anemia  - Cat II FHT, remote from delivery          Procedures:   Procedure(s): -Primary low transverse  section  -Spinal anesthesia          Medications Prior to Admission:     Prescriptions Prior to Admission   Medication Sig Dispense Refill Last Dose     hydrOXYzine (ATARAX) 25 MG tablet Take 2-4 tablets ( mg) by mouth nightly as needed for anxiety (sleep and pain) 20 tablet 0 Past Week at Unknown time     ferrous gluconate (FERGON) 324 (38 FE) MG tablet Take 1 tablet (324 mg) by mouth 3 times daily (with meals) 60 tablet 0 2017 at Unknown time     Acetaminophen (TYLENOL PO) Take 500 mg by mouth every 4 hours as needed for mild pain or fever   Past Month at Unknown time     Prenatal Vit-Fe Fumarate-FA (PRENATAL MULTIVITAMIN  PLUS IRON) 27-0.8 MG TABS per tablet Take 1 tablet by mouth daily 100 tablet 3 2017 at Unknown time     [DISCONTINUED] oxyCODONE-acetaminophen (PERCOCET) 5-325 MG per tablet Take 1-2 tablets by mouth every 4 hours as needed for moderate to severe pain 10 tablet 0 Past Month at Unknown time     [DISCONTINUED] oxyCODONE-acetaminophen (PERCOCET) 5-325 MG per tablet Take 1-2 tablets by mouth every 6 hours as needed for pain maximum 8 tablet(s) per day 10 tablet 0 Past Month at Unknown time     albuterol (PROAIR HFA/PROVENTIL HFA/VENTOLIN HFA) 108 (90 BASE) MCG/ACT Inhaler Inhale 2 puffs into the lungs every 6 hours as needed for shortness of breath / dyspnea or wheezing Reported on 3/15/2017 3 Inhaler 1 More than a month at Unknown time          Discharge Medications:        Review  of your medicines      START taking       Dose / Directions    ibuprofen 600 MG tablet   Commonly known as:  ADVIL/MOTRIN        Dose:  600 mg   Take 1 tablet (600 mg) by mouth every 6 hours as needed for moderate pain   Quantity:  30 tablet   Refills:  1       senna-docusate 8.6-50 MG per tablet   Commonly known as:  SENOKOT-S;PERICOLACE        Dose:  1-2 tablet   Take 1-2 tablets by mouth 2 times daily as needed for constipation   Quantity:  30 tablet   Refills:  0         CONTINUE these medicines which may have CHANGED, or have new prescriptions. If we are uncertain of the size of tablets/capsules you have at home, strength may be listed as something that might have changed.       Dose / Directions    oxyCODONE-acetaminophen 5-325 MG per tablet   Commonly known as:  PERCOCET   This may have changed:    - when to take this  - additional instructions  - Another medication with the same name was removed. Continue taking this medication, and follow the directions you see here.        Dose:  1-2 tablet   Take 1-2 tablets by mouth every 4 hours as needed for pain   Quantity:  30 tablet   Refills:  0         CONTINUE these medicines which have NOT CHANGED       Dose / Directions    albuterol 108 (90 BASE) MCG/ACT Inhaler   Commonly known as:  PROAIR HFA/PROVENTIL HFA/VENTOLIN HFA        Dose:  2 puff   Inhale 2 puffs into the lungs every 6 hours as needed for shortness of breath / dyspnea or wheezing Reported on 3/15/2017   Quantity:  3 Inhaler   Refills:  1       ferrous gluconate 324 (38 FE) MG tablet   Commonly known as:  FERGON   Used for:  Iron deficiency anemia secondary to inadequate dietary iron intake, Encounter for supervision of normal first pregnancy in second trimester        Dose:  324 mg   Take 1 tablet (324 mg) by mouth 3 times daily (with meals)   Quantity:  60 tablet   Refills:  0       hydrOXYzine 25 MG tablet   Commonly known as:  ATARAX   Used for:  Tension headache        Dose:   mg   Take  2-4 tablets ( mg) by mouth nightly as needed for anxiety (sleep and pain)   Quantity:  20 tablet   Refills:  0       prenatal multivitamin  plus iron 27-0.8 MG Tabs per tablet   Used for:  Mild intermittent asthma without complication        Dose:  1 tablet   Take 1 tablet by mouth daily   Quantity:  100 tablet   Refills:  3       TYLENOL PO   Indication:  Pain        Dose:  500 mg   Take 500 mg by mouth every 4 hours as needed for mild pain or fever   Refills:  0            Where to get your medicines      These medications were sent to [place], [place] - [address]  [address] [address]     Phone:  [phone]      ibuprofen 600 MG tablet     senna-docusate 8.6-50 MG per tablet         Some of these will need a paper prescription and others can be bought over the counter. Ask your nurse if you have questions.     Bring a paper prescription for each of these medications      oxyCODONE-acetaminophen 5-325 MG per tablet                 Consultations:   None          Brief Admission History     Ms. Julieth Connelly is a 22 year old  at 38w3d by 19w US, who presented to triage under the care of the CN service for decreased fetal movement and was found to have Cat II FHT given minimal variability and nonreactive tracing despite intrauterine resuscitation. She was recommended to undergo a primary  section for Cat II FHT remote from delivery.         Brief Operative Course:   Procedure was uncomplicated. EBL 1000 mL.    Findings:   1. Liveborn female infant in ROP presentation delivered at 1846 on 2017. Apgars 8 at 1 minute & 9 at 5 minutes. Weight 2466g.  2.  Venous cord pH 7.35, base deficit 0.6.  3. Clear amniotic fluid  4. Placenta small but intact and with 3-vessel cord.   5. No intrabdominal adhesions.  No fascial adhesions.    6. Normal uterus with arcuate appearance, fallopian tubes, and ovaries.     See full operative note for  further details.          Hospital Course:   The patient's hospital course was unremarkable. On discharge, her pain was well controlled. Vaginal bleeding is similar to menstrual flow. Voiding without difficulty.  Ambulating well and tolerating a normal diet.  No fever. Breastfeeding well.  Infant is stable. She was discharged on post-partum day #3.    Post-partum hemoglobin: 8.8          Discharge Instructions and Follow-Up:   Discharge diet: Regular   Discharge activity: No lifting greater than 20 lbs, pushing, pulling, or other strenuous activity for 6 weeks. Pelvic rest for 6 weeks including no sexual intercourse, tampons, or douching. No driving until you can slam on the breaks without pain or while on narcotic pain medications.    Discharge follow-up: Follow up with your primary OB for an incision check in 1 week and a routine postpartum visit in 6 weeks           Discharge Disposition:   Discharged to home      Raeann Rangel MD  Obstetrics and Gynecology, PGY-2                 Physician Attestation   I, Reema Broussard, saw and evaluated this patient prior to discharge.  I discussed the patient with the resident and agree with plan of care as documented in the resident note.      I personally reviewed vital signs, medications, labs and exam.    I personally spent 15 minutes on discharge activities.  Discussed homegoing precautions, spacing out pain medication and weaning off.    Reema Broussard  Date of Service (when I saw the patient): 06/27/17

## 2017-06-26 NOTE — PLAN OF CARE
Problem: Goal Outcome Summary  Goal: Goal Outcome Summary  Outcome: Therapy, progress toward functional goals as expected  Data: Vital signs within normal limits. Postpartum checks within normal limits - see flow record. Patient eating and drinking normally.  No apparent signs of infection. Incision UTV. Encouraging patient to void every 3-4 hours, as she has been having intense incisional pain which is relieved after she voids. performing self cares and is able to care for infant. Breastfeeding independently.  Action: Patient medicated during the shift for pain. See MAR. Patient taking tylenol, ibuprofen, oxycodone and simethicone.   Response: Positive attachment behaviors observed with infant.   Plan: Continue plan of care.

## 2017-06-26 NOTE — PLAN OF CARE
Problem: Postpartum ( Delivery) (Adult)  Goal: Signs and Symptoms of Listed Potential Problems Will be Absent or Manageable (Postpartum)  Signs and symptoms of listed potential problems will be absent or manageable by discharge/transition of care (reference Postpartum ( Delivery) (Adult) CPG).   Outcome: No Change  VSS. Fundus and lochia wdl. Incision wdl-dressing on. Voiding without difficulty. Ambulating in the room independently. Pain well controlled with medications and hot packs prn. Breastfeeding independently for the most part; no nipple breakdown. Bonding well with baby; instructed in infant cares. Family at the bedside and supportive.

## 2017-06-26 NOTE — PROGRESS NOTES
[facility] Postpartum Rounding Note    S: Doing well this AM. Feels like she has to get up to urinate again. Straight cathed once yesterday, has since been voiding spontaneously without difficulty with timed voids every 2-3 hours. Ambulating without dizziness, eating and drinking without nausea. Lochia less than a typical period. Passing gas, has not yet had a BM. Pain well controlled on oral medications. Breastfeeding without difficulty.     O:  Vitals:    17 0700 17 1057 17 1620 17 0100   BP: 123/69 125/81 134/90 126/76   Resp: 16 16 18 16   Temp: 98  F (36.7  C) 98.2  F (36.8  C) 98.1  F (36.7  C) 98.1  F (36.7  C)   TempSrc: Oral Oral Oral Oral   SpO2: 99% 100%       Gen: Resting in bed, NAD  CV: RRR, extremities warm and well perfused  Lungs: Breathing comfortably on room air  Abd: Soft, nondistended, FF at umbilicus  Incision: dressing in place dry without shadowing  Ext: non-tender, +1 b/l edema, no erythema    A: 22 year old  POD#2 s/p PLTCS for Cat II FHT, remote from delivery.      P:   1. Routine post-operative care.  2. Heme: 10.2> EBL 1000>8.8, asymptomatic from acute blood loss anemia, will discharge home with iron  3. Rh positive, no Rhogam indicated. Rubella immune  4. Pain moderately well controlled on oral tylenol, jami and ibuprofen.   5. Infant: Doing well, working on breastfeeding.  6. Birth control: Undecided, options discussed    Anticipate discharge home POD#3, working on pain control, bladder function and breastfeeding today   Raeann Rangel MD  Obstetrics and Gynecology, PGY-2        Physician Attestation   I, Dione Michaud, personally examined and evaluated this patient.  I discussed the patient with the resident and care team, and agree with the assessment and plan of care as documented in the resident s note of 2017  [date].      I personally reviewed vital signs, medications, labs and exam.    Key findings: 22 year old  on POD 2 s/p PLTCS.  Doing well, but not yet ready for discharge. Working on pain control. Encouraged ambulation, has not ambulated much since surgery. Feels like she has to have a bowel movement but has not yet been able to, discussed potential suppository if she desires.  Continue timed voiding. Remove dressing today. Anticipate discharge to home 6/27, discussed.  Dione Michaud  Date of Service (when I saw the patient): 06/26/17

## 2017-06-27 VITALS
DIASTOLIC BLOOD PRESSURE: 77 MMHG | OXYGEN SATURATION: 98 % | RESPIRATION RATE: 16 BRPM | SYSTOLIC BLOOD PRESSURE: 130 MMHG | TEMPERATURE: 97.5 F

## 2017-06-27 PROCEDURE — 25000132 ZZH RX MED GY IP 250 OP 250 PS 637: Performed by: OBSTETRICS & GYNECOLOGY

## 2017-06-27 RX ADMIN — SENNOSIDES AND DOCUSATE SODIUM 2 TABLET: 8.6; 5 TABLET ORAL at 11:06

## 2017-06-27 RX ADMIN — IBUPROFEN 800 MG: 400 TABLET ORAL at 02:14

## 2017-06-27 RX ADMIN — IBUPROFEN 800 MG: 400 TABLET ORAL at 11:06

## 2017-06-27 RX ADMIN — OXYCODONE HYDROCHLORIDE 10 MG: 5 TABLET ORAL at 01:03

## 2017-06-27 RX ADMIN — OXYCODONE HYDROCHLORIDE 10 MG: 5 TABLET ORAL at 07:05

## 2017-06-27 RX ADMIN — SIMETHICONE CHEW TAB 80 MG 80 MG: 80 TABLET ORAL at 07:05

## 2017-06-27 RX ADMIN — ACETAMINOPHEN 975 MG: 325 TABLET, FILM COATED ORAL at 02:14

## 2017-06-27 RX ADMIN — OXYCODONE HYDROCHLORIDE 10 MG: 5 TABLET ORAL at 04:09

## 2017-06-27 RX ADMIN — ACETAMINOPHEN 975 MG: 325 TABLET, FILM COATED ORAL at 11:06

## 2017-06-27 NOTE — PLAN OF CARE
Problem: Goal Outcome Summary  Goal: Goal Outcome Summary  Outcome: Improving  PP assessment WNL. Vital signs stable. Pt up ad anjel, steady gait. Intake and output remains appropriate. Pt pain managed with Ibuprofen, Tylenol, and 10mg roxicodone for incisional pain. Incision dressing removed, moderate drainage noted, new steri strips applied to site with scant amt of new drainage noted on right side of incision. Pt complains of left sided incision site due to glue that was applied-see Charge nurse note. Inter dry applied to incision site along with incisional care education completed. Breastfeeding infant independently on cue, latch assessed and is appropriate. Pt's friend is in room, positive support system. Will continue with pt plan of care.

## 2017-06-27 NOTE — PLAN OF CARE
Problem: Goal Outcome Summary  Goal: Goal Outcome Summary  Outcome: No Change  Called to room for incisional pain on left side of incision. Not incisional area 4 inches from last steri strip glue was  causing Pt Pain, alcohol was used to clean area Pt sates feels better, Small open area on upper left noted 1/8th of an inch in size,  not sure cause but no drainage at this time. Medicated for pain with hot packs and settled for sleep.

## 2017-06-27 NOTE — PLAN OF CARE
Problem: Goal Outcome Summary  Goal: Goal Outcome Summary  Outcome: Adequate for Discharge Date Met:  06/27/17  Data: Vital signs stable, assessments within normal limits.   No evidence of infection, patient instructed of signs/symptoms to look for and report per discharge instructions.   Discharge outcomes on care plan met.   Action: Review of care plan, teaching, and discharge instructions done with patient. Home meds and breast pump given to patient.   Response: patient states understanding and comfortable with self cares. All questions about self care addressed. Patient is discharged to home with infant on 06/27/2017.

## 2017-06-27 NOTE — PLAN OF CARE
Problem: Goal Outcome Summary  Goal: Goal Outcome Summary  Outcome: No Change  VSS and assessments WDL. Pain well managed with oral meds. Ambulating without difficulty, voiding freely. Breastfeeding independently, per pt self report. No concerns at this time, continue with POC.

## 2017-07-18 ENCOUNTER — OFFICE VISIT (OUTPATIENT)
Dept: OBGYN | Facility: CLINIC | Age: 22
End: 2017-07-18
Payer: COMMERCIAL

## 2017-07-18 VITALS
TEMPERATURE: 98.3 F | DIASTOLIC BLOOD PRESSURE: 68 MMHG | BODY MASS INDEX: 34.58 KG/M2 | WEIGHT: 211 LBS | HEART RATE: 101 BPM | SYSTOLIC BLOOD PRESSURE: 114 MMHG

## 2017-07-18 DIAGNOSIS — Z98.891 STATUS POST CESAREAN SECTION: Primary | ICD-10-CM

## 2017-07-18 PROCEDURE — 99024 POSTOP FOLLOW-UP VISIT: CPT | Performed by: OBSTETRICS & GYNECOLOGY

## 2017-07-18 NOTE — PROGRESS NOTES
GYN Clinic Visit  2017    CC:  Incision check    HPI:  Julieth Connelly is a 22 year old  who presents 3 weeks status post primary  section on 17 for category 2 fetal tracing remote form delivery. She is here today for an incision check.    Since her surgery patient reports doing well. Pain is well controlled. Appetite is normal. No nausea or vomiting. Voiding without difficulty. Having regular bowel movements. Ambulating. No concerns. Breast feeding. Light lochia.      Reviewed PMH, PSH, Meds and allergies.    Objective:  Vitals:    17 1340   BP: 114/68   Pulse: 101   Temp: 98.3  F (36.8  C)   TempSrc: Oral   Weight: 211 lb (95.7 kg)     Body mass index is 34.58 kg/(m^2).    General: alert, oriented, no distress  Abd: soft, nontender, nondistended, no masses or organomegaly. Pfannenstiel incision well approximated and healing without separation, erythema or drainage.     Assessment:  Incision healing well    Plan:   Return to clinic in 3-5 weeks for postpartum visit  Recommend 5 weeks if she wants an IUD    Dione Michaud MD

## 2017-07-18 NOTE — MR AVS SNAPSHOT
After Visit Summary   2017    Julieth Connelly    MRN: [id number]           Patient Information     Date Of Birth          1995        Visit Information        Provider Department      2017 1:45 PM Dione Michaud MD [facility]        Today's Diagnoses     Status post  section    -  1       Follow-ups after your visit        Who to contact     If you have questions or need follow up information about today's clinic visit or your schedule please contact [facility] directly at [phone].  Normal or non-critical lab and imaging results will be communicated to you by Aldagenhart, letter or phone within 4 business days after the clinic has received the results. If you do not hear from us within 7 days, please contact the clinic through Taplistert or phone. If you have a critical or abnormal lab result, we will notify you by phone as soon as possible.  Submit refill requests through [employer] or call your pharmacy and they will forward the refill request to us. Please allow 3 business days for your refill to be completed.          Additional Information About Your Visit        MyChart Information     [employer] gives you secure access to your electronic health record. If you see a primary care provider, you can also send messages to your care team and make appointments. If you have questions, please call your primary care clinic.  If you do not have a primary care provider, please call [phone] and they will assist you.        Care EveryWhere ID     This is your Care EveryWhere ID. This could be used by other organizations to access your [facility] medical records  [id number]        Your Vitals Were     Pulse Temperature Breastfeeding? BMI (Body Mass Index)          101 98.3  F (36.8  C) (Oral) Yes 34.58 kg/m2         Blood Pressure from Last 3 Encounters:   17 114/68   17 130/77   17 130/79    Weight from Last 3 Encounters:    07/18/17 211 lb (95.7 kg)   06/21/17 230 lb (104.3 kg)   06/14/17 229 lb (103.9 kg)              Today, you had the following     No orders found for display       Primary Care Provider Office Phone # Fax #    Alethea Schmid [phone]       [address] [place]   [facility] [address]        Equal Access to Services     GEORGE ROLLE : Hadii aad ku hadasho Soomaali, waaxda luqadaha, qaybta kaalmada adeegyada, waxay idiin hayaan adeeg sahrasurendraamanda lagiovanni . So [facility] [phone].    ATENCIÓN: Si isabella dora, tiene a morel disposición servicios gratuitos de asistencia lingüística. Llame al [phone].    We comply with applicable federal civil rights laws and [place] laws. We do not discriminate on the basis of race, color, national origin, age, disability sex, sexual orientation or gender identity.            Thank you!     Thank you for choosing [facility]  for your care. Our goal is always to provide you with excellent care. Hearing back from our patients is one way we can continue to improve our services. Please take a few minutes to complete the written survey that you may receive in the mail after your visit with us. Thank you!             Your Updated Medication List - Protect others around you: Learn how to safely use, store and throw away your medicines at www.disposemymeds.org.          This list is accurate as of: 7/18/17  4:40 PM.  Always use your most recent med list.                   Brand Name Dispense Instructions for use Diagnosis    albuterol 108 (90 BASE) MCG/ACT Inhaler    PROAIR HFA/PROVENTIL HFA/VENTOLIN HFA    3 Inhaler    Inhale 2 puffs into the lungs every 6 hours as needed for shortness of breath / dyspnea or wheezing Reported on 3/15/2017        ferrous gluconate 324 (38 FE) MG tablet    FERGON    60 tablet    Take 1 tablet (324 mg) by mouth 3 times daily (with meals)    Iron deficiency anemia secondary to inadequate dietary iron intake,  Encounter for supervision of normal first pregnancy in second trimester       hydrOXYzine 25 MG tablet    ATARAX    20 tablet    Take 2-4 tablets ( mg) by mouth nightly as needed for anxiety (sleep and pain)    Tension headache       ibuprofen 600 MG tablet    ADVIL/MOTRIN    30 tablet    Take 1 tablet (600 mg) by mouth every 6 hours as needed for moderate pain    S/P  section       oxyCODONE-acetaminophen 5-325 MG per tablet    PERCOCET    30 tablet    Take 1-2 tablets by mouth every 4 hours as needed for pain    S/P  section       prenatal multivitamin  plus iron 27-0.8 MG Tabs per tablet     100 tablet    Take 1 tablet by mouth daily    Mild intermittent asthma without complication       senna-docusate 8.6-50 MG per tablet    SENOKOT-S;PERICOLACE    30 tablet    Take 1-2 tablets by mouth 2 times daily as needed for constipation    S/P  section       TYLENOL PO      Take 500 mg by mouth every 4 hours as needed for mild pain or fever

## 2017-07-18 NOTE — NURSING NOTE
"Chief Complaint   Patient presents with     Wound Check       Initial /68  Pulse 101  Temp 98.3  F (36.8  C) (Oral)  Wt 211 lb (95.7 kg)  Breastfeeding? Yes  BMI 34.58 kg/m2 Estimated body mass index is 34.58 kg/(m^2) as calculated from the following:    Height as of 3/15/17: 5' 5.5\" (1.664 m).    Weight as of this encounter: 211 lb (95.7 kg).  BP completed using cuff size: regular        The following HM Due: NONE      The following patient reported/Care Every where data was sent to:  P ABSTRACT QUALITY INITIATIVES [[id number]]  na     n/a             "

## 2018-01-07 ENCOUNTER — HEALTH MAINTENANCE LETTER (OUTPATIENT)
Age: 23
End: 2018-01-07

## 2018-06-05 ENCOUNTER — HEALTH MAINTENANCE LETTER (OUTPATIENT)
Age: 23
End: 2018-06-05

## 2018-11-22 ENCOUNTER — NURSE TRIAGE (OUTPATIENT)
Dept: NURSING | Facility: CLINIC | Age: 23
End: 2018-11-22

## 2018-11-22 NOTE — TELEPHONE ENCOUNTER
Pt states she has left sided chest pain and is having some difficulty with breathing, not struggling or gasping for breath but notices some difficulty. Denies cardiac hx, feeling weak or rapid pulse. States it feels like pressure on her chest.     Protocol and care advice reviewed.  Pt directed to call 911 and she states she will, ended call for her to call 911.  Caller states understanding of the recommended disposition.   Advised to call back if further questions or concerns.      Reason for Disposition    [1] Chest pain lasts > 5 minutes AND [2] described as crushing, pressure-like, or heavy    Additional Information    Negative: Severe difficulty breathing (e.g., struggling for each breath, speaks in single words)    Negative: Difficult to awaken or acting confused (e.g., disoriented, slurred speech)    Negative: Shock suspected (e.g., cold/pale/clammy skin, too weak to stand, low BP, rapid pulse)    Negative: [1] Chest pain lasts > 5 minutes AND [2] history of heart disease  (i.e., heart attack, bypass surgery, angina, angioplasty, CHF; not just a heart murmur)    Protocols used: CHEST PAIN-ADULT-

## 2018-11-28 ENCOUNTER — NURSE TRIAGE (OUTPATIENT)
Dept: NURSING | Facility: CLINIC | Age: 23
End: 2018-11-28

## 2018-11-29 NOTE — TELEPHONE ENCOUNTER
"Will see provider within 24 hours per guidelines.  Zina Steiner RN  [facility]      Reason for Disposition    [1] MILD (e.g., does not interfere with normal activities) AND [2] pain comes and goes (cramps) AND [3] present > 48 hours    Additional Information    Negative: Shock suspected (e.g., cold/pale/clammy skin, too weak to stand, low BP, rapid pulse)    Negative: Difficult to awaken or acting confused  (e.g., disoriented, slurred speech)    Negative: Passed out (i.e., lost consciousness, collapsed and was not responding)    Negative: Sounds like a life-threatening emergency to the triager    Negative: [1] SEVERE pain (e.g., excruciating) AND [2] present > 1 hour    Negative: [1] SEVERE pain AND [2] age > 60    Negative: [1] Vomiting AND [2] contains red blood or black (\"coffee ground\") material  (Exception: few red streaks in vomit that only happened once)    Negative: Blood in bowel movements   (Exception: blood on surface of BM with constipation)    Negative: Black or tarry bowel movements  (Exception: chronic-unchanged  black-grey bowel movements AND is taking iron pills or Pepto-bismol)    Negative: Patient sounds very sick or weak to the triager    Negative: [1] MILD-MODERATE pain AND [2] constant AND [3] present > 2 hours    Negative: [1] Vomiting AND [2] abdomen looks much more swollen than usual    Negative: [1] Vomiting AND [2] contains bile (green color)    Negative: White of the eyes have turned yellow (i.e., jaundice)    Negative: Fever > 103 F (39.4 C)    Negative: [1] Fever > 101 F (38.3 C) AND [2] age > 60    Negative: [1] Fever > 101 F (38.3 C) AND [2] bedridden (e.g., nursing home patient, CVA, chronic illness, recovering from surgery)    Negative: [1] Fever > 100.5 F (38.1 C) AND [2] diabetes mellitus or weak immune system (e.g., HIV positive, cancer chemo, splenectomy, organ transplant, chronic steroids)    Negative: [1] SEVERE abdominal pain AND [2] present < 1 hour  (all " triage questions negative)    Protocols used: ABDOMINAL PAIN - FEMALE-ADULT-AH

## 2019-06-09 ENCOUNTER — NURSE TRIAGE (OUTPATIENT)
Dept: NURSING | Facility: CLINIC | Age: 24
End: 2019-06-09

## 2019-06-10 NOTE — TELEPHONE ENCOUNTER
Julieth reports 8.5/10 constant abdominal pain when standing. When she sits down the pain comes and goes. She has been taking tylenol but it wears off before the next dose is available. Patient has had abdominal pain for about a week, but today the pain has gotten worse and now patient doesn't want to walk because it hurts so bad.     Triaged and advised ED. Protocol and care advice reviewed.  Caller states understanding of the recommended disposition.  Advised to call back if further questions or concerns.    Karyna Mccrary RN/[facility]    Reason for Disposition    [1] SEVERE pain (e.g., excruciating) AND [2] present > 1 hour    Additional Information    Negative: Shock suspected (e.g., cold/pale/clammy skin, too weak to stand, low BP, rapid pulse)    Negative: Difficult to awaken or acting confused (e.g., disoriented, slurred speech)    Negative: Passed out (i.e., lost consciousness, collapsed and was not responding)    Negative: Sounds like a life-threatening emergency to the triager    Protocols used: ABDOMINAL PAIN - FEMALE-A-AH

## 2019-08-04 ENCOUNTER — NURSE TRIAGE (OUTPATIENT)
Dept: NURSING | Facility: CLINIC | Age: 24
End: 2019-08-04

## 2019-08-04 NOTE — TELEPHONE ENCOUNTER
"Patient reports ovarian cyst rupture and started having constant abdominal pain of 6.5/10 for the past  3-4 hours (since she woke up about 10am).  Patient reports nausea and dizziness when standing.  Reviewed care advice with caller to be evaluated in the ED.  Caller verbalizes understanding.      Reason for Disposition    [1] MILD-MODERATE pain AND [2] constant AND [3] present > 2 hours    Additional Information    Negative: Shock suspected (e.g., cold/pale/clammy skin, too weak to stand, low BP, rapid pulse)    Negative: Difficult to awaken or acting confused (e.g., disoriented, slurred speech)    Negative: Passed out (i.e., lost consciousness, collapsed and was not responding)    Negative: Sounds like a life-threatening emergency to the triager    Negative: Chest pain    Negative: Pain is mainly in upper abdomen  (if needed ask: \"is it mainly above the belly button?\")    Negative: Followed an abdomen (stomach) injury    Negative: [1] Abdominal pain AND [2] pregnant < 20 weeks    Negative: [1] Abdominal pain AND [2] pregnant > 20 weeks    Negative: [1] Abdominal pain AND [2] postpartum < 1 month since delivery    Negative: [1] SEVERE pain (e.g., excruciating) AND [2] present > 1 hour    Negative: [1] SEVERE pain AND [2] age > 60    Negative: [1] Vomiting AND [2] contains red blood or black (\"coffee ground\") material  (Exception: few red streaks in vomit that only happened once)    Negative: Blood in bowel movements   (Exception: blood on surface of BM with constipation)    Negative: Black or tarry bowel movements  (Exception: chronic-unchanged  black-grey bowel movements AND is taking iron pills or Pepto-bismol)    Negative: Patient sounds very sick or weak to the triager    Protocols used: ABDOMINAL PAIN - FEMALE-A-AH      "

## 2019-08-06 ENCOUNTER — NURSE TRIAGE (OUTPATIENT)
Dept: NURSING | Facility: CLINIC | Age: 24
End: 2019-08-06

## 2019-08-07 NOTE — TELEPHONE ENCOUNTER
Caller states she is 3 days into her menstrual cycle and is having severe abdominal pain along with passing lots of blood clots. Caller rates pain 10/10 and states she has taken 1,000mg of ibuprofen and tylenol with no relief. Triage guidelines recommend to go to ED. Caller verbalized and understands directives.    Reason for Disposition    Patient sounds very sick or weak to the triager    Additional Information    Negative: Sounds like a life-threatening emergency to the triager    Negative: Abdominal cramps unrelated to menstrual period    Negative: [1] SEVERE pain AND [2] pain clearly increases with coughing    Protocols used: ABDOMINAL PAIN - MENSTRUAL CRAMPS-A-AH

## 2020-03-10 ENCOUNTER — HEALTH MAINTENANCE LETTER (OUTPATIENT)
Age: 25
End: 2020-03-10

## 2020-07-09 ENCOUNTER — NURSE TRIAGE (OUTPATIENT)
Dept: NURSING | Facility: CLINIC | Age: 25
End: 2020-07-09

## 2020-07-09 NOTE — TELEPHONE ENCOUNTER
Tooth pain > one week. Finished a course of amoxicillin four days ago.  No fever.  Would like another round of antibiotic.  She noted improvement with the antibiotic though not completely resolved.  She's going to have that tooth pull.  Her dentist was who provided the Rx for amoxicillin.   I suggested calling the dentist with her request.  I told her if dentist is not able to help her she should consider going to an   Anise stated understanding and agreed to the above.      COVID 19 Nurse Triage Plan/Patient Instructions    Please be aware that novel coronavirus (COVID-19) may be circulating in the community. If you develop symptoms such as fever, cough, or SOB or if you have concerns about the presence of another infection including coronavirus (COVID-19), please contact your health care provider or visit [email or web address].     Disposition/Instructions    Home care recommended. Follow home care protocol based instructions.    Thank you for taking steps to prevent the spread of this virus.  o Limit your contact with others.  o Wear a simple mask to cover your cough.  o Wash your hands well and often.    Resources    [facility]: About COVID-19: [email or web address]    CDC: What to Do If You're Sick: www.cdc.gov/coronavirus/2019-ncov/about/steps-when-sick.html    CDC: Ending Home Isolation: www.cdc.gov/coronavirus/2019-ncov/hcp/disposition-in-home-patients.html     CDC: Caring for Someone: www.cdc.gov/coronavirus/2019-ncov/if-you-are-sick/care-for-someone.html     [facility]: Interim Guidance for Hospital Discharge to Home: www.health.[facility].[place]/diseases/coronavirus/hcp/hospdischarge.pdf    [facility] clinical trials (COVID-19 research studies): [email or web address]     Below are the COVID-19 hotlines at the [place] Department of Health ([facility]). Interpreters are available.   o For health questions: Call 938-914-8798 or 1-326.859.2498 (7 a.m. to 7 p.m.)  o For questions  "about schools and childcare: Call 854-779-6848 or 1-426.900.8659 (7 a.m. to 7 p.m.)       Reason for Disposition    [1] Face is swollen AND [2] no fever    Additional Information    Negative: Severe difficulty breathing (e.g., struggling for each breath, speaks in single words, stridor)    Negative: Sounds like a life-threatening emergency to the triager    Tooth is painful or swelling around a tooth    Negative: Shock suspected (e.g., cold/pale/clammy skin, too weak to stand, low BP, rapid pulse)    Negative: [1] Similar pain previously AND [2] it was from \"heart attack\"    Negative: [1] Similar pain previously AND [2] it was from \"angina\" AND [3] not relieved by nitroglycerin    Negative: Sounds like a life-threatening emergency to the triager    Negative: Tongue is very swollen and tender    Negative: [1] Face is swollen AND [2] fever    Negative: Patient sounds very sick or weak to the triager    Negative: [1] SEVERE pain (e.g., excruciating, unable to do any normal activities) AND [2] not improved 2 hours after pain medicine    Negative: Face is very swollen    Protocols used: TOOTHACHE-A-AH, MOUTH PAIN-A-AH    Rosa POPE RN [facility]     "

## 2020-12-27 ENCOUNTER — HEALTH MAINTENANCE LETTER (OUTPATIENT)
Age: 25
End: 2020-12-27

## 2021-04-13 ENCOUNTER — OFFICE VISIT (OUTPATIENT)
Dept: OBGYN | Facility: CLINIC | Age: 26
End: 2021-04-13
Payer: COMMERCIAL

## 2021-04-13 VITALS
DIASTOLIC BLOOD PRESSURE: 73 MMHG | HEART RATE: 108 BPM | BODY MASS INDEX: 40.81 KG/M2 | OXYGEN SATURATION: 97 % | SYSTOLIC BLOOD PRESSURE: 111 MMHG | WEIGHT: 249 LBS

## 2021-04-13 DIAGNOSIS — E28.2 PCOS (POLYCYSTIC OVARIAN SYNDROME): ICD-10-CM

## 2021-04-13 DIAGNOSIS — N93.9 ABNORMAL UTERINE BLEEDING (AUB): ICD-10-CM

## 2021-04-13 DIAGNOSIS — Z12.4 CERVICAL CANCER SCREENING: ICD-10-CM

## 2021-04-13 DIAGNOSIS — R10.2 PELVIC PAIN IN FEMALE: Primary | ICD-10-CM

## 2021-04-13 LAB
ALBUMIN UR-MCNC: ABNORMAL MG/DL
APPEARANCE UR: CLEAR
BILIRUB UR QL STRIP: NEGATIVE
COLOR UR AUTO: YELLOW
GLUCOSE UR STRIP-MCNC: NEGATIVE MG/DL
HCG UR QL: NEGATIVE
HGB UR QL STRIP: NEGATIVE
KETONES UR STRIP-MCNC: NEGATIVE MG/DL
LEUKOCYTE ESTERASE UR QL STRIP: NEGATIVE
NITRATE UR QL: NEGATIVE
NON-SQ EPI CELLS #/AREA URNS LPF: ABNORMAL /LPF
PH UR STRIP: 7 PH (ref 5–7)
RBC #/AREA URNS AUTO: ABNORMAL /HPF
SOURCE: ABNORMAL
SP GR UR STRIP: 1.02 (ref 1–1.03)
SPECIMEN SOURCE: ABNORMAL
UROBILINOGEN UR STRIP-ACNC: 0.2 EU/DL (ref 0.2–1)
WBC #/AREA URNS AUTO: ABNORMAL /HPF
WET PREP SPEC: ABNORMAL

## 2021-04-13 PROCEDURE — 96372 THER/PROPH/DIAG INJ SC/IM: CPT | Performed by: OBSTETRICS & GYNECOLOGY

## 2021-04-13 PROCEDURE — G0145 SCR C/V CYTO,THINLAYER,RESCR: HCPCS | Performed by: OBSTETRICS & GYNECOLOGY

## 2021-04-13 PROCEDURE — 81001 URINALYSIS AUTO W/SCOPE: CPT | Performed by: OBSTETRICS & GYNECOLOGY

## 2021-04-13 PROCEDURE — 87210 SMEAR WET MOUNT SALINE/INK: CPT | Performed by: OBSTETRICS & GYNECOLOGY

## 2021-04-13 PROCEDURE — 87591 N.GONORRHOEAE DNA AMP PROB: CPT | Performed by: OBSTETRICS & GYNECOLOGY

## 2021-04-13 PROCEDURE — 87491 CHLMYD TRACH DNA AMP PROBE: CPT | Performed by: OBSTETRICS & GYNECOLOGY

## 2021-04-13 PROCEDURE — 81025 URINE PREGNANCY TEST: CPT | Performed by: OBSTETRICS & GYNECOLOGY

## 2021-04-13 PROCEDURE — 99204 OFFICE O/P NEW MOD 45 MIN: CPT | Mod: 25 | Performed by: OBSTETRICS & GYNECOLOGY

## 2021-04-13 RX ORDER — MEDROXYPROGESTERONE ACETATE 150 MG/ML
150 INJECTION, SUSPENSION INTRAMUSCULAR
Status: ACTIVE | OUTPATIENT
Start: 2021-04-13 | End: 2022-04-08

## 2021-04-13 RX ADMIN — MEDROXYPROGESTERONE ACETATE 150 MG: 150 INJECTION, SUSPENSION INTRAMUSCULAR at 12:34

## 2021-04-13 NOTE — PROGRESS NOTES
[facility]- OBGYN    CC: history of ovarian cysts    S:Julieth Connelly is a 26 year old  who presents today for low abdominal pain and history of ovarian cysts.  Patient reports that for years she has had abdominal pain on and off that when evaluated turns out to be due to ovarian cysts.  She states that she currently has low abdominal and pelvic pain that feels like cramping.  She takes tylenol and ibuprofen with some improvement in pain.    On review of Care Everywhere ultrasound:  20-  IMPRESSION:  1.  There is a 2.0 cm cyst or dominant follicle in the right ovary. No specific  follow-up is recommended for a cyst of this size in a patient of this unless  there are persistent systems. Guidelines below.  2.  Otherwise, unremarkable pelvic ultrasound.    19-  IMPRESSION:   CONCLUSION:  1.  Multiple similar sized follicles bilaterally nonspecific but associated with polycystic ovary disease. Correlate clinically. Small amount of nonspecific free fluid within the pelvis and around both adnexa.    19-  CONCLUSION:  1.  Small to moderate amount of free pelvic fluid.  2.  No sonographic evidence of left ovarian torsion.  3.  In a nonpregnant patient, findings are most consistent with a hemorrhagic/involuting right sided corpus luteal cyst.    OBGYN Hx:   OB History    Para Term  AB Living   1 1 1 0 0 1   SAB TAB Ectopic Multiple Live Births   0 0 0 0 1      # Outcome Date GA Lbr Aiden/2nd Weight Sex Delivery Anes PTL Lv   1 Term 17 38w3d  2.466 kg (5 lb 7 oz) F CS-LTranv Spinal N LAKSHMI      Complications: Fetal Intolerance, Non-reassuring electronic fetal monitoring tracing      Name: Swapna      Apgar1: 8  Apgar5: 9       LMP- 3/16/21  Menses:irregular.  Occurs every 1-2 months.  When it starts it is typically long and heavy, lasting up to 14 days.    Sexually active with male partner  Contraception: none.  Previously used depo provera with good control of menses.   She does not remember having pelvic pain issues while taking depo provera  STD Hx:none   Pap hx: none    PMH: asthma, migraine with aura.  Patient denies any history of VTE or HTN  PSH:   Past Surgical History:   Procedure Laterality Date     APPENDECTOMY  2015      SECTION N/A 2017    Procedure:  SECTION;;  Surgeon: Maritza Valentin MD;  Location: [facility] L[facility]     TONSILLECTOMY       Meds:tylenol and ibuprofen prn  Allergies:NKDA  SH: Denies any tobacco, alcohol, or drug use  FH: Denies any family history of bleeding or clotting disorders.  Paternal grandmother with ovarian cancer     O:   Patient Vitals for the past 24 hrs:   BP Pulse SpO2 Weight   21 1137 111/73 108 97 % 112.9 kg (249 lb)   ]  Exam:  General- awake, alert, answering questions appropriately, appears comfortable  Lung- breathing comfortably on room air  - normal appearing external genitalia, normal appearing vaginal mucosa, normal appearing cervix with moderate amount of vaginal discharge.      A&P: Julieth Connelly is a 26 year old  who presents today for low abdominal pain and history of ovarian cysts.    (R10.2) Pelvic pain in female  (primary encounter diagnosis)  Comment: Reviewed with patient potential causes of pelvic pain.  These include infectious causes such as UTI and vaginitis.  Will rule out with UA/UC, wet prep, and GC/CT.  On prior ultrasound patient does have a history of ovarian cyst formation (functional cyst/dominant follicle, hemorrhagic cyst).  Reviewed with patient these can be normal findings when someone is not taking anything any birth control for ovulatory suppression.  Will get pelvic ultrasound to ensure no other structural causes of pain.    Plan: UA with Microscopic reflex to Culture, Wet         prep, NEISSERIA GONORRHOEA PCR, CHLAMYDIA         TRACHOMATIS PCR, HCG Qual, Urine ([id number]), US         Transvaginal Non OB    (Z12.4) Cervical cancer screening  Comment: patient due  for pap, collected  Plan: Pap imaged thin layer screen only - recommended        age 21 - 24 years    (N93.9) Abnormal uterine bleeding  Comment: Reviewed with patient that treatment for ovarian cysts as well as likely diagnosis of PCOS is ovulatory suppression with birth control.  Patient is not a good candidate for estrogen containing contraceptives because of her history of migraine with aura.  Reviewed progesterone only options including POPs, Nexplanon, Depo provera, and Mirena.  Mirena and POPs do not as reliably suppress ovulation and Nexplanon potential bleeding profile is not ideal for patient.  She has previously had good period control with Depo provera, thus will start today after negative pregnancy test.  Plan: medroxyPROGESTERone (DEPO-PROVERA) injection         150 mg    (E28.2) PCOS (polycystic ovarian syndrome)  Comment: Patient with prior ultrasound showing polycysitc appearing ovaries.  This along with irregular periods meets criteria for PCOS diagnosis.  Reviewed this is treated with birth control for menstrual regulation.  Patient with obesity and PCOS is associated with metabolic syndrome, thus will check HgbA1c at next visit.  Will also plan for TSH to evaluate for potential thyroid abnormality influencing periods.   Plan: [id number] and TSH at next visit.    Zaina De Los Santos MD    A total of 50 minutes was spent on 4/13/21 reviewing chart, outside records, interviewing/examining/and discussing recommendations with patient, and documentation.

## 2021-04-13 NOTE — LETTER
April 21, 2021      Julieth Connelly  [address] [id number]  [facility] [address]        Dear ,    We are writing to inform you of your test results as we have been trying to get a hold of you. The number we have listed in your chart has been disconnected or no longer in service.     We need to discuss your wet prep results and gonorrhea and chlamydia results as we need to send some prescriptions to the pharmacy for you.     Please call the clinic ASAP. [phone], [facility] for OB/GYN.       Sincerely,      Zaina De Los Santos MD

## 2021-04-13 NOTE — PATIENT INSTRUCTIONS
Patient Education     Ovarian Cysts  A cyst is often a fluid-filled sac, like a small water balloon. Cysts are almost always harmless, and many go away on their own. Often they grow slowly. They can vary in size from as small as a pea to larger than a grapefruit. Many cause no symptoms at all. Often they are felt only during a pelvic exam. Ovarian cysts are often not cancer (benign).        Functional cyst  A functional cyst is the most common kind of cyst. It forms when a follicle doesn't release a mature egg or continues to grow after releasing the egg. Functional cysts often occur on only one ovary at a time. They often shrink on their own in 1 to 3 months. In rare cases, a cyst will break open (rupture), causing pain. Pain might also be caused by the twisting of an ovary that is enlarged because of the cyst growing on it.      Dermoid cyst  Sometimes cells that are present from birth will start to grow into different kinds of tissue such as skin, fat, hair, and teeth. This kind of cyst is called a dermoid cyst. Dermoid cysts can grow on one or both ovaries. Often they cause no symptoms. But if they leak or the ovary becomes twisted, they can cause severe pain.     Endometrioma  Sometimes tissue similar to the lining of the uterus (endometrium) grows and becomes part of the ovary. This kind of cyst is often called a chocolate cyst because of its dark-brown color. These cysts can grow on one or both ovaries. They often cause pain, especially around menstruation or during sex.     Benign cystadenoma  If the capsule around the ovary grows, it can form a cystadenoma. These cysts can grow on one or both ovaries. Often they cause no symptoms if they are small. But if they become large, they can press on organs near the ovaries, causing pain. They can also cause pain by stretching the ovarian capsule. A cyst that pushes on the bladder can cause frequent urination. Sometimes these cysts break open and bleed.   Cancer  "(malignant) cysts  These cysts can invade other tissues or spread to other parts of the body.   [employer] last reviewed this educational content on 2/1/2021 2000-2021 [employer]. All rights reserved. This information is not intended as a substitute for professional medical care. Always follow your healthcare professional's instructions.           Patient Education     Polycystic Ovary Syndrome  Polycystic ovary syndrome (PCOS) causes harmless, small cysts in the ovaries and other symptoms. PCOS is caused by certain hormones being out of balance. The word  syndrome  means a group of symptoms. Women with PCOS may have no periods, irregular periods, or very long periods.     Your ovaries  Women store their eggs in their ovaries. Each egg is in a capsule called a follicle. Normally during the reproductive years, one follicle grows to produce a mature egg each month. This egg is released during ovulation and the follicle dissolves.   Hormones out of balance  With PCOS, the hormones that control ovulation are out of balance. These include estrogen, progesterone, and androgen. As a result, ovulation may not occur. Instead, the follicle stays enlarged. This is a fluid-filled sac called a cyst. Over time, the ovaries fill with many small cysts. This is why they are called \"poly\" or many \"cystic\" ovaries. In some women, the ovaries also make too much androgen.   Symptoms of PCOS  Women with PCOS may also have one or more of these symptoms:     Acne    Hair growth on the face and other parts of the body    Patches of thick, velvety, dark skin (acanthosis nigricans), often on the neck or groin area    Trouble getting pregnant (fertility problems)    Weight gain, often around the waist   Women with PCOS are also at greater risk of having cancer of the uterine lining, diabetes, and heart disease.   [employer] last reviewed this educational content on 5/1/2020 2000-2021 [employer]. All rights " reserved. This information is not intended as a substitute for professional medical care. Always follow your healthcare professional's instructions.           Patient Education     Patient Education    Medroxyprogesterone Acetate Oral tablet    Medroxyprogesterone Acetate Suspension for injection [Contraception]    Medroxyprogesterone Acetate Suspension for injection [Malignancy]  Medroxyprogesterone Acetate Suspension for injection [Contraception]  What is this medicine?  MEDROXYPROGESTERONE (me DROX ee proe PREETI te sushma) contraceptive injections prevent pregnancy. They provide effective birth control for 3 months. Depo-subQ Provera 104 is also used for treating pain related to endometriosis.  This medicine may be used for other purposes; ask your health care provider or pharmacist if you have questions.  What should I tell my health care provider before I take this medicine?  They need to know if you have any of these conditions:    frequently drink alcohol    asthma    blood vessel disease or a history of a blood clot in the lungs or legs    bone disease such as osteoporosis    breast cancer    diabetes    eating disorder (anorexia nervosa or bulimia)    high blood pressure    HIV infection or AIDS    kidney disease    liver disease    mental depression    migraine    seizures (convulsions)    stroke    tobacco smoker    vaginal bleeding    an unusual or allergic reaction to medroxyprogesterone, other hormones, medicines, foods, dyes, or preservatives    pregnant or trying to get pregnant    breast-feeding  How should I use this medicine?  Depo-Provera Contraceptive injection is given into a muscle. Depo-subQ Provera 104 injection is given under the skin. These injections are given by a health care professional. You must not be pregnant before getting an injection. The injection is usually given during the first 5 days after the start of a menstrual period or 6 weeks after delivery of a baby.  Talk to your  pediatrician regarding the use of this medicine in children. Special care may be needed. These injections have been used in female children who have started having menstrual periods.  Overdosage: If you think you have taken too much of this medicine contact a poison control center or emergency room at once.  NOTE: This medicine is only for you. Do not share this medicine with others.  What if I miss a dose?  Try not to miss a dose. You must get an injection once every 3 months to maintain birth control. If you cannot keep an appointment, call and reschedule it. If you wait longer than 13 weeks between Depo-Provera contraceptive injections or longer than 14 weeks between Depo-subQ Provera 104 injections, you could get pregnant. Use another method for birth control if you miss your appointment. You may also need a pregnancy test before receiving another injection.  What may interact with this medicine?  Do not take this medicine with any of the following medications:    bosentan  This medicine may also interact with the following medications:    aminoglutethimide    antibiotics or medicines for infections, especially rifampin, rifabutin, rifapentine, and griseofulvin    aprepitant    barbiturate medicines such as phenobarbital or primidone    bexarotene    carbamazepine    medicines for seizures like ethotoin, felbamate, oxcarbazepine, phenytoin, topiramate    modafinil    Andale's wort  This list may not describe all possible interactions. Give your health care provider a list of all the medicines, herbs, non-prescription drugs, or dietary supplements you use. Also tell them if you smoke, drink alcohol, or use illegal drugs. Some items may interact with your medicine.  What should I watch for while using this medicine?  This drug does not protect you against HIV infection (AIDS) or other sexually transmitted diseases.  Use of this product may cause you to lose calcium from your bones. Loss of calcium may cause weak  bones (osteoporosis). Only use this product for more than 2 years if other forms of birth control are not right for you. The longer you use this product for birth control the more likely you will be at risk for weak bones. Ask your health care professional how you can keep strong bones.  You may have a change in bleeding pattern or irregular periods. Many females stop having periods while taking this drug.  If you have received your injections on time, your chance of being pregnant is very low. If you think you may be pregnant, see your health care professional as soon as possible.  Tell your health care professional if you want to get pregnant within the next year. The effect of this medicine may last a long time after you get your last injection.  What side effects may I notice from receiving this medicine?  Side effects that you should report to your doctor or health care professional as soon as possible:    allergic reactions like skin rash, itching or hives, swelling of the face, lips, or tongue    breast tenderness or discharge    breathing problems    changes in vision    depression    feeling faint or lightheaded, falls    fever    pain in the abdomen, chest, groin, or leg    problems with balance, talking, walking    unusually weak or tired    yellowing of the eyes or skin  Side effects that usually do not require medical attention (report to your doctor or health care professional if they continue or are bothersome):    acne    fluid retention and swelling    headache    irregular periods, spotting, or absent periods    temporary pain, itching, or skin reaction at site where injected    weight gain  This list may not describe all possible side effects. Call your doctor for medical advice about side effects. You may report side effects to FDA at 4-499-FDA-7039.  Where should I keep my medicine?  This does not apply. The injection will be given to you by a health care professional.  NOTE:This sheet is a  summary. It may not cover all possible information. If you have questions about this medicine, talk to your doctor, pharmacist, or health care provider. Copyright  2016 [employer]

## 2021-04-14 LAB
C TRACH DNA SPEC QL NAA+PROBE: POSITIVE
N GONORRHOEA DNA SPEC QL NAA+PROBE: NEGATIVE
SPECIMEN SOURCE: ABNORMAL
SPECIMEN SOURCE: NORMAL

## 2021-04-16 LAB
COPATH REPORT: NORMAL
PAP: NORMAL

## 2021-04-23 ENCOUNTER — OFFICE VISIT (OUTPATIENT)
Dept: OBGYN | Facility: CLINIC | Age: 26
End: 2021-04-23
Attending: OBSTETRICS & GYNECOLOGY
Payer: COMMERCIAL

## 2021-04-23 ENCOUNTER — ANCILLARY PROCEDURE (OUTPATIENT)
Dept: ULTRASOUND IMAGING | Facility: CLINIC | Age: 26
End: 2021-04-23
Attending: OBSTETRICS & GYNECOLOGY
Payer: COMMERCIAL

## 2021-04-23 VITALS
HEART RATE: 107 BPM | BODY MASS INDEX: 40.64 KG/M2 | OXYGEN SATURATION: 96 % | DIASTOLIC BLOOD PRESSURE: 80 MMHG | WEIGHT: 248 LBS | SYSTOLIC BLOOD PRESSURE: 115 MMHG

## 2021-04-23 DIAGNOSIS — A74.9 CHLAMYDIA: ICD-10-CM

## 2021-04-23 DIAGNOSIS — Z86.2 HISTORY OF ANEMIA: ICD-10-CM

## 2021-04-23 DIAGNOSIS — R10.2 PELVIC PAIN IN FEMALE: Primary | ICD-10-CM

## 2021-04-23 DIAGNOSIS — E28.2 PCOS (POLYCYSTIC OVARIAN SYNDROME): ICD-10-CM

## 2021-04-23 DIAGNOSIS — N92.6 IRREGULAR MENSES: ICD-10-CM

## 2021-04-23 DIAGNOSIS — R10.2 PELVIC PAIN IN FEMALE: ICD-10-CM

## 2021-04-23 DIAGNOSIS — B96.89 BV (BACTERIAL VAGINOSIS): ICD-10-CM

## 2021-04-23 DIAGNOSIS — N76.0 BV (BACTERIAL VAGINOSIS): ICD-10-CM

## 2021-04-23 PROBLEM — E66.01 MORBID OBESITY (H): Status: ACTIVE | Noted: 2021-04-23

## 2021-04-23 LAB
ERYTHROCYTE [DISTWIDTH] IN BLOOD BY AUTOMATED COUNT: 18.4 % (ref 10–15)
FERRITIN SERPL-MCNC: 6 NG/ML (ref 12–150)
HBA1C MFR BLD: 5.5 % (ref 0–5.6)
HCT VFR BLD AUTO: 32.1 % (ref 35–47)
HGB BLD-MCNC: 9.9 G/DL (ref 11.7–15.7)
IRON SATN MFR SERPL: 7 % (ref 15–46)
IRON SERPL-MCNC: 34 UG/DL (ref 35–180)
MCH RBC QN AUTO: 20.7 PG (ref 26.5–33)
MCHC RBC AUTO-ENTMCNC: 30.8 G/DL (ref 31.5–36.5)
MCV RBC AUTO: 67 FL (ref 78–100)
PLATELET # BLD AUTO: 435 10E9/L (ref 150–450)
RBC # BLD AUTO: 4.79 10E12/L (ref 3.8–5.2)
TIBC SERPL-MCNC: 470 UG/DL (ref 240–430)
TSH SERPL DL<=0.005 MIU/L-ACNC: 0.54 MU/L (ref 0.4–4)
WBC # BLD AUTO: 8.4 10E9/L (ref 4–11)

## 2021-04-23 PROCEDURE — 85027 COMPLETE CBC AUTOMATED: CPT | Performed by: OBSTETRICS & GYNECOLOGY

## 2021-04-23 PROCEDURE — 99214 OFFICE O/P EST MOD 30 MIN: CPT | Performed by: OBSTETRICS & GYNECOLOGY

## 2021-04-23 PROCEDURE — 83036 HEMOGLOBIN GLYCOSYLATED A1C: CPT | Performed by: OBSTETRICS & GYNECOLOGY

## 2021-04-23 PROCEDURE — 76830 TRANSVAGINAL US NON-OB: CPT | Performed by: OBSTETRICS & GYNECOLOGY

## 2021-04-23 PROCEDURE — 83550 IRON BINDING TEST: CPT | Performed by: OBSTETRICS & GYNECOLOGY

## 2021-04-23 PROCEDURE — 82728 ASSAY OF FERRITIN: CPT | Performed by: OBSTETRICS & GYNECOLOGY

## 2021-04-23 PROCEDURE — 83540 ASSAY OF IRON: CPT | Performed by: OBSTETRICS & GYNECOLOGY

## 2021-04-23 PROCEDURE — 36415 COLL VENOUS BLD VENIPUNCTURE: CPT | Performed by: OBSTETRICS & GYNECOLOGY

## 2021-04-23 PROCEDURE — 84443 ASSAY THYROID STIM HORMONE: CPT | Performed by: OBSTETRICS & GYNECOLOGY

## 2021-04-23 RX ORDER — AZITHROMYCIN 1 G/1
1 POWDER, FOR SUSPENSION ORAL ONCE
Qty: 1 EACH | Refills: 0 | Status: SHIPPED | OUTPATIENT
Start: 2021-04-23 | End: 2021-04-23

## 2021-04-23 RX ORDER — AZITHROMYCIN 500 MG/1
1000 TABLET, FILM COATED ORAL DAILY
Qty: 2 TABLET | Refills: 0 | Status: SHIPPED | OUTPATIENT
Start: 2021-04-23

## 2021-04-23 RX ORDER — METRONIDAZOLE 500 MG/1
500 TABLET ORAL 2 TIMES DAILY
Qty: 14 TABLET | Refills: 0 | Status: SHIPPED | OUTPATIENT
Start: 2021-04-23 | End: 2021-04-30

## 2021-04-23 RX ORDER — AZITHROMYCIN 600 MG/1
1200 TABLET, FILM COATED ORAL DAILY
Qty: 2 TABLET | Refills: 0 | Status: SHIPPED | OUTPATIENT
Start: 2021-04-23

## 2021-04-23 NOTE — PROGRESS NOTES
[facility]- OBGYN    CC: ultrasound review for pelvic pain    S:Julieth Connelly is a 26 year old  who presents today for ultrasound review for pelvic pain.  Patient reports she continues to have pelvic pain.  She has not had any vaginal bleeding or menses since 3/15/21.  After being informed of her positive chlamydia testing she states she knows that partner she would have contracted it from and feels safe to tell him the results so he can get partner treatment.      21- initial eval for pelvic pain.  Patient tested positive for chlamydia and BV.  Was not able to be reached with result due to incorrect phone number in computer and incorrect email linked to [employer]..  (these have been corrected.        OBGYN Hx:   OB History    Para Term  AB Living   1 1 1 0 0 1   SAB TAB Ectopic Multiple Live Births   0 0 0 0 1      # Outcome Date GA Lbr Aiden/2nd Weight Sex Delivery Anes PTL Lv   1 Term 17 38w3d  2.466 kg (5 lb 7 oz) F CS-LTranv Spinal N LAKSHMI      Complications: Fetal Intolerance, Non-reassuring electronic fetal monitoring tracing      Name: Swapna      Apgar1: 8  Apgar5: 9       LMP-3/15/21  Menses: irregular   STD Hx: no prior history of chlamydia   Pap hx: 21 NIL       O:   Patient Vitals for the past 24 hrs:   BP Pulse SpO2 Weight   21 1311 115/80 107 96 % 112.5 kg (248 lb)   ]  Exam:  General- awake, alert, answering questions appropriately, appears comfortable  CV- regular rate  Lung- breathing comfortably on room air      Imaging and Labs:  Gynecological Ultrasound Report  Pelvic U/S - Transvaginal   [facility]  Referring Provider: Zaina De Los Santos MD      Sonographer:  Aura Mallory RDMS  Indication: Bleeding/Menses- Metrorrhagia (irregular menses); pelvic pain  LMP: 3/15/21     Gynecological Ultrasonography:   Uterus: anteverted. Contour is smooth/regular.  Size: 8.3 x 5.7 x 4.6 cm  Endometrium: Thickness  Total 20.1 mm  Findings: Thickened endometrium  Right Ovary: 4.0 x 3.1 x 2.7 cm. Multiple small cysts on periphery  Left Ovary: 3.6 x 2.2 x 1.9 cm. Multiple small cysts on periphery  Cul de Sac Free Fluid: Trace     Impression:   The uterus and ovaries were visualized. The endometrium appears thickened.  Both ovaries have mulitple small follicles around the periphery, consistent with the appearance of polycystic ovaries.  Recommend clinical correlation.     Dione Michaud MD    A&P: Julieth Connelly is a 26 year old  who presents today for ultrasound review for pelvic pain.     (R10.2) Pelvic pain in female  (primary encounter diagnosis)  Comment: Reviewed pelvic ultrasound with patient as well as results from her GC/CT and wet prep.  Explained likely pelvic discomfort is due to chlamydia and bacterial vaginosis.   Also reviewed ultrasound results that are negative for structural causes of pain such as ovarian cyst, fibroid, or endometrial polyp.  Patient's EMS is thickened, but she has not had a menses in 5 weeks, so not unexpected.    Plan: treat vaginitis.  If pain is not resolved with treatment, then will treat with additional antibiotics for PID.      (E28.2) PCOS (polycystic ovarian syndrome)  Comment: Patient with polycystic appearing ovaries on ultrasound.  This along with irregular menses meets criteria for PCOS.  Reviewed findings of ultrasound and importance of screening for diabetes.  Patient reports strong family history of diabetes and agrees with testing today.  Will continue to regulate menses with depo provera  Plan: Hemoglobin A1c    (N92.6) Irregular menses  Comment: Patient's irregular menses likely due to PCOS.  Will also rule out thyroid abnormalities as contributing  Plan: TSH with free T4 reflex    (Z86.2) History of anemia  Comment: Patient reports a history of anemia and is agreeable for checking the status of her anemia at this time.  Plan: CBC with platelets, Iron and  iron binding         capacity, Ferritin    (N76.0,  B96.89) BV (bacterial vaginosis)  Comment: For BV will treat with flagyl 500mg BID for 7 days.   Plan: metroNIDAZOLE (FLAGYL) 500 MG tablet         (A74.9) Chlamydia  Comment: Reviewed diagnosis and importance of partner treatment.  Will treat with 1g of PO azithromycin now.  If pelvic pain not resolved, will then treat for PID.  Recommend abstaining from intercourse for 7 days after both partners are treated.    Plan:         azithromycin (ZITHROMAX) 500 MG tablet  Plan for test of cure in 3-4 weeks.      Zaina De Los Santos MD

## 2021-04-23 NOTE — PATIENT INSTRUCTIONS
Patient Education     Chlamydia  Chlamydia is a very common sexually transmitted infection (STI). An STI is also called a sexually transmitted disease (STD). Most people don't have symptoms. Because of this, chlamydia may not be noticed until it's passed to someone else or it causes severe problems. Left untreated, this infection may make it hard or impossible for women and men to have children.       Symptoms  Many people with chlamydia have no symptoms. Women are more likely than men not to have symptoms.  If symptoms show up in women, they include:    Abnormal vaginal discharge    Bleeding between periods    Pain or burning during urination  If symptoms show up in men, they include:    Clear discharge (drip) from the penis    Pain or burning during urination    Rectal pain, discharge, or bleeding, especially in men who have sex with men  These symptoms usually disappear after a few weeks, with or without treatment. But if you are not treated, the chlamydia will still be present. It can cause long-term problems.  Potential problems  If the infection is not treated, it can lead to more serious health problems. In women, this can be pelvic inflammatory disease (PID). PID can make it hard or even impossible for a women to have a baby. It can also cause an ectopic (tubal) pregnancy. This type of pregnancy can't be carried to term. Symptoms of PID include fever, pain during sex, and pain in the belly. In men, an untreated chlamydia infection can damage the testes. This can cause pain and scarring. This can possibly affect the ability to have children. Chlamydia of the rectal area can cause serious damage. This includes infection and holes (fistulas).  Sexually active women and men should get checked for chlamydia regularly. This can help prevent PID.  Treatment  Chlamydia can be treated when found early. It can be cured with antibiotics. If you have it, tell your partner right away. Because people often don t have  symptoms, those diagnosed with chlamydia should ask their partners to get tested.  Prevention  Know your partner s history. Protect yourself by using a latex condom whenever you have sex. If you are pregnant, take extra care to get correct treatment. Pregnant women with untreated chlamydia can pass the infection on to the baby. This can cause eye, ear, or lung problems in the baby. There is also the risk of a premature delivery.  Resources  American Sexual Health Association [phone] www.ashasexualhealth.org/  CDC  746.459.6595  www.cdc.gov/std  Jonathan last reviewed this educational content on 12/1/2018 2000-2021 [employer]. All rights reserved. This information is not intended as a substitute for professional medical care. Always follow your healthcare professional's instructions.           Patient Education     Chlamydia, Treated (Female)    You have an infection called chlamydia. This is a sexually transmitted infection (STI). It is also called a sexually transmitted disease. It can be passed easily from one person to another. It is passed on by having vaginal, anal, or oral sexual contact with an infected partner. Chlamydia can infect the internal sex organs. These are the cervix, uterus, and fallopian tubes. It can also infect the mouth, throat, and anus. But this happens less often.   Women with an infection in the cervix may have no symptoms. Or they may have only mild symptoms early in the illness. That s why you are able to pass this infection on without knowing you have it.   When symptoms do occur, they often start 2 to 10 days after you are exposed to chlamydia. You may have a vaginal discharge. You may have pain or burning when you urinate. The infection is called pelvic inflammatory disease (PID) if it spreads to the fallopian tubes. This causes lower belly (abdominal) pain and fever. Chlamydia that isn t treated can make you unable to have children (infertility). This is  because it harms the fallopian tubes. PID also makes it more likely for you to have a tubal (ectopic) pregnancy in the future. This is a serious health problem.   Chlamydia can be treated and cured. Treatment is with medicines called antibiotics.   Home care  Follow these guidelines when caring for yourself at home:    Your sexual partner must be treated at the same time. This is true even if he or she has no symptoms. Your partner should reach out to his or her own healthcare provider for treatment. He or she can also go to an urgent care clinic or the public health department. Sometimes your own provider may prescribe antibiotics for your partner. This is called expedited partner therapy.    Don t have sex until both you and your partner have finished taking all the antibiotics. Wait until your healthcare provider has told you that you are cured.    Take all medicines until they are gone. Otherwise your symptoms and the infection may come back.    Learn about safe sex practices and use these in the future. The safest sex is with a partner who has tested negative and only has sex with you. Condoms can keep you from spreading some STIs. These include gonorrhea, chlamydia, and HIV. But condoms are not a guarantee.  Follow-up care  STI testing    Follow up with your healthcare provider on any test results, or as advised. Talk with your provider or your local health department to be sure you are having complete STI (including HIV) screening. Also make sure you are lowering your risk of STIs,including HIV, as much as possible. This includes HIV testing. Call the [facility] National STI Hotline at 128-039-6924 for more information about STIs.    People who get chlamydia can get another STI at the same time, such as gonorrhea, syphilis, or HIV. Get tested for HIV now. If negative, get tested again in 3 months. Also talk with your healthcare provider about whether taking anti-HIV medicines would be a good idea. Your provider may  advise you take such medicine now for 28 days or on an ongoing basis to prevent you from getting HIV.  Tell your partner  Make sure you talk with your partner about STIs and testing. If you don't feel safe talking face-to-face with your partner about testing, send a text or email. Or make a phone call instead. Ask for help if you re not safe. Encourage your partner(s) to get treatment. Otherwise, he or she can pass the disease back to you or others.   If you have an STI, talk with your provider about expedited partner therapy (EPT) . With EPT, you may be given a prescription or medicines to give to your partner without your partner needing to be seen by a healthcare provider. EPT is available in many states for some STIs (mainly chlamydia and gonorrhea). So check with your provider.   When to seek medical care  Call your healthcare provider right away if any of these occur:    Symptoms not better after 3 days of treatment    New pain in your lower abdomen or back    Pain in your lower abdomen or back gets worse    Unexpected vaginal bleeding    Weakness, dizziness, or fainting    Repeated vomiting    Unable to urinate because of pain    Rash or joint pain    Painful open sores around the outer vagina    Enlarged, painful lumps (lymph nodes) in your groin    Fever of 100.4 F (38 C) or higher, or as directed by your healthcare provider   [place] last reviewed this educational content on 4/1/2020 2000-2021 [employer]. All rights reserved. This information is not intended as a substitute for professional medical care. Always follow your healthcare professional's instructions.

## 2021-04-24 ENCOUNTER — HEALTH MAINTENANCE LETTER (OUTPATIENT)
Age: 26
End: 2021-04-24

## 2021-04-26 ENCOUNTER — TELEPHONE (OUTPATIENT)
Dept: OBGYN | Facility: CLINIC | Age: 26
End: 2021-04-26

## 2021-04-26 DIAGNOSIS — N76.0 BV (BACTERIAL VAGINOSIS): Primary | ICD-10-CM

## 2021-04-26 DIAGNOSIS — B96.89 BV (BACTERIAL VAGINOSIS): Primary | ICD-10-CM

## 2021-04-26 RX ORDER — METRONIDAZOLE 7.5 MG/G
1 GEL VAGINAL AT BEDTIME
Qty: 25 G | Refills: 0 | Status: SHIPPED | OUTPATIENT
Start: 2021-04-26 | End: 2021-05-01

## 2021-04-26 NOTE — TELEPHONE ENCOUNTER
Patient calling to get an alternative prescription for the metronidazole as she is throwing it up. Pharmacy is teed up. Please send new prescription. Thanks.   Odalis Tran, PAN-BSN

## 2021-09-10 NOTE — PROGRESS NOTES
[facility] Postpartum Rounding Note    S: Doing well this AM, still feeling some pain when feeling urge to void. Describes pain near urethra. Not feeling pain when times voids every 2-3 hours.. Ambulating without dizziness, eating and drinking without nausea. Lochia less than a typical period. Passing gas, has not yet had a BM, but feels as if she could soon. Pain well controlled on oral medications. Breastfeeding without difficulty.     O:  Vitals:    17 0100 17 0800 17 1828 17 2352   BP: 126/76 105/61 139/86 129/87   Resp: 16 16 16 16   Temp: 98.1  F (36.7  C) 98.5  F (36.9  C) 98.1  F (36.7  C) 98.1  F (36.7  C)   TempSrc: Oral Oral Oral Oral   SpO2:  98%       Gen: Resting in bed, NAD  CV: RRR, extremities warm and well perfused  Lungs: Breathing comfortably on room air  Abd: Soft, nondistended, FF 1 cm below umbilicus  Incision: Incision intact, dry without discharge, no erythema, steri-strips in place   Ext: non-tender, +1 b/l edema, no erythema    A: 22 year old  POD#3 s/p PLTCS for Cat II FHT, remote from delivery.      P:   1. Routine post-operative care.  2. Heme: 10.2> EBL 1000>8.8, asymptomatic from acute blood loss anemia, will discharge home with iron  3. Rh positive, no Rhogam indicated. Rubella immune  4. Pain moderately well controlled on oral tylenol, jami and ibuprofen.   5. Infant: Doing well, working on breastfeeding.  6. Birth control: Undecided, options discussed  7. Discussed taking senna BID and adding Miralax as needed for constipation    Discharge to home today  Raeann Rangel MD  Obstetrics and Gynecology, PGY-2        Physician Attestation   I, Reema Broussard, saw and evaluated this patient prior to discharge.  I discussed the patient with the resident and agree with plan of care as documented in the resident note.      I personally reviewed vital signs, medications, labs and exam.    I personally spent 15 minutes on discharge activities.  Discussed  homegoing precautions, spacing out pain medication and weaning off.    Reema Broussard  Date of Service (when I saw the patient): 06/27/17       Modified Advancement Flap Text: The defect edges were debeveled with a #15 scalpel blade.  Given the location of the defect, shape of the defect and the proximity to free margins a modified advancement flap was deemed most appropriate.  Using a sterile surgical marker, an appropriate advancement flap was drawn incorporating the defect and placing the expected incisions within the relaxed skin tension lines where possible.    The area thus outlined was incised deep to adipose tissue with a #15 scalpel blade.  The skin margins were undermined to an appropriate distance in all directions utilizing iris scissors.

## 2021-10-09 ENCOUNTER — HEALTH MAINTENANCE LETTER (OUTPATIENT)
Age: 26
End: 2021-10-09

## 2022-05-16 ENCOUNTER — HEALTH MAINTENANCE LETTER (OUTPATIENT)
Age: 27
End: 2022-05-16

## 2022-09-11 ENCOUNTER — HEALTH MAINTENANCE LETTER (OUTPATIENT)
Age: 27
End: 2022-09-11

## 2023-06-03 ENCOUNTER — HEALTH MAINTENANCE LETTER (OUTPATIENT)
Age: 28
End: 2023-06-03

## 2023-08-28 ENCOUNTER — VIRTUAL VISIT (OUTPATIENT)
Dept: URGENT CARE | Facility: CLINIC | Age: 28
End: 2023-08-28
Payer: COMMERCIAL

## 2023-08-28 DIAGNOSIS — T63.441A BEE STING REACTION, ACCIDENTAL OR UNINTENTIONAL, INITIAL ENCOUNTER: Primary | ICD-10-CM

## 2023-08-28 PROCEDURE — 99203 OFFICE O/P NEW LOW 30 MIN: CPT | Mod: VID

## 2023-08-28 NOTE — PATIENT INSTRUCTIONS
Zytrec or Claritin during day and Benadryl at night.    Ice pack to area if painful or swollen    Calamine lotion if itchy or OTC hydrocortisone cream    Go to ER if you have trouble breathing, tongue swelling, or SOB noted.

## 2023-08-28 NOTE — PROGRESS NOTES
Julieth is a 28 year old female who presents for a billable video visit.    ASSESSMENT/PLAN:    ICD-10-CM    1. Bee sting reaction, accidental or unintentional, initial encounter  T63.441A         Bee sting occurring today and will treat with supportive measures including Zyrtec or Claritin, ice pack, calamine lotion or over-the-counter steroid cream as needed.  A work note was excusing her for today's absence to monitor symptoms and go to the ER if symptoms should worsen into anaphylaxis.    Follow up with primary care provider with any problems, questions or concerns or if symptoms worsen or fail to improve. Patient verbalized understanding and is agreeable to plan.     SUBJECTIVE:  Julieth Connelly is a 28 year old who presents for bee sting that occurred an hour ago on her stomach. She reports having pain and discomfort in this area.      Patient denies fever/chills, difficulty breathing, throat/tongue swelling, new meds, pets, foods, soaps, detergents, lotions, or enviornmental contacts.    OTC: benadryl     Review of Systems  All systems reviewed and negative except per HPI.      OBJECTIVE:  Vitals not done due to this being a virtual visit    GENERAL: Healthy, alert and no distress  EYES: Eyes grossly normal to inspection.  No discharge or erythema, or obvious scleral/conjunctival abnormalities.  RESP: No audible wheeze, cough, or visible cyanosis.  No visible retractions or increased work of breathing.    SKIN: Visible skin clear. No significant rash, abnormal pigmentation or lesions. Unable to visualize insect bite on abdomen via video.   PSYCH: Mentation appears normal, affect normal/bright, judgement and insight intact, normal speech and appearance well-groomed.    Video-Visit Details    Type of service:  Video Visit  Video Start Time: 9:56 AM  Video End Time: 10:07 AM    Originating Location (pt. Location): Home    Distant Location (provider location):  [facility]      Platform used for Video Visit: Jose

## 2023-08-28 NOTE — LETTER
August 28, 2023      Julieth Connelly  [address]  [facility] [address]        To Whom It May Concern:    Julieth Connelly  was seen on 8/28/23.  Please excuse her absence today due to illness.        Sincerely,        [facility]

## 2024-05-07 ENCOUNTER — HOSPITAL ENCOUNTER (EMERGENCY)
Facility: CLINIC | Age: 29
Discharge: HOME | End: 2024-05-07
Attending: STUDENT IN AN ORGANIZED HEALTH CARE EDUCATION/TRAINING PROGRAM | Admitting: STUDENT IN AN ORGANIZED HEALTH CARE EDUCATION/TRAINING PROGRAM
Payer: COMMERCIAL

## 2024-05-07 VITALS
RESPIRATION RATE: 16 BRPM | WEIGHT: 235 LBS | DIASTOLIC BLOOD PRESSURE: 89 MMHG | SYSTOLIC BLOOD PRESSURE: 131 MMHG | OXYGEN SATURATION: 100 % | BODY MASS INDEX: 39.11 KG/M2 | TEMPERATURE: 98.5 F | HEART RATE: 82 BPM

## 2024-05-07 DIAGNOSIS — J02.9 PHARYNGITIS, UNSPECIFIED ETIOLOGY: ICD-10-CM

## 2024-05-07 LAB — GROUP A STREP BY PCR: NOT DETECTED

## 2024-05-07 PROCEDURE — 87651 STREP A DNA AMP PROBE: CPT | Performed by: STUDENT IN AN ORGANIZED HEALTH CARE EDUCATION/TRAINING PROGRAM

## 2024-05-07 PROCEDURE — 99282 EMERGENCY DEPT VISIT SF MDM: CPT | Performed by: STUDENT IN AN ORGANIZED HEALTH CARE EDUCATION/TRAINING PROGRAM

## 2024-05-07 PROCEDURE — 99283 EMERGENCY DEPT VISIT LOW MDM: CPT | Performed by: STUDENT IN AN ORGANIZED HEALTH CARE EDUCATION/TRAINING PROGRAM

## 2024-05-07 ASSESSMENT — COLUMBIA-SUICIDE SEVERITY RATING SCALE - C-SSRS
1. IN THE PAST MONTH, HAVE YOU WISHED YOU WERE DEAD OR WISHED YOU COULD GO TO SLEEP AND NOT WAKE UP?: NO
6. HAVE YOU EVER DONE ANYTHING, STARTED TO DO ANYTHING, OR PREPARED TO DO ANYTHING TO END YOUR LIFE?: NO
2. HAVE YOU ACTUALLY HAD ANY THOUGHTS OF KILLING YOURSELF IN THE PAST MONTH?: NO

## 2024-05-07 ASSESSMENT — ACTIVITIES OF DAILY LIVING (ADL)
ADLS_ACUITY_SCORE: 35
ADLS_ACUITY_SCORE: 33

## 2024-05-08 NOTE — DISCHARGE INSTRUCTIONS
You were seen in the emergency room and evaluated for sore throat  Your strep throat test here today was negative  If you have any new or worsening symptoms including but not limited to trouble opening or closing her mouth, trouble breathing, fevers, chills please return immediately to the emergency room  Otherwise please follow-up with your primary care doctor in the next 1 to 2 days  If your strep test is negative I recommend you treat your infection symptomatically

## 2024-05-08 NOTE — ED TRIAGE NOTES
Cough and sore throat for the past 3 days. Denies any fevers. Tylenol around 430pm for mom.     Triage Assessment (Adult)       Row Name 05/07/24 1959          Triage Assessment    Airway WDL WDL        Respiratory WDL    Respiratory WDL WDL        Skin Circulation/Temperature WDL    Skin Circulation/Temperature WDL WDL        Cardiac WDL    Cardiac WDL WDL        Peripheral/Neurovascular WDL    Peripheral Neurovascular WDL WDL        Cognitive/Neuro/Behavioral WDL    Cognitive/Neuro/Behavioral WDL WDL                      

## 2024-05-08 NOTE — ED PROVIDER NOTES
[facility] EMERGENCY DEPARTMENT ([facility])    24      ED PROVIDER NOTE  Hallway I    History     Chief Complaint   Patient presents with    Pharyngitis    Cough     HPI  Julieth Connelly is a 29 year old female with history of asthma presenting with sore throat. She presents with her daughter who has similar symptoms. She reports 3 days of sore throat, cough, and congestion. No fevers. No issues breathing. No other medical problems.    Past Medical History  Past Medical History:   Diagnosis Date    Migraine without status migrainosus, not intractable     Migraine    Mild intermittent asthma, uncomplicated     Asthma     Past Surgical History:   Procedure Laterality Date    APPENDECTOMY       SECTION N/A 2017    Procedure:  SECTION;;  Surgeon: Maritza Valentin MD;  Location: [facility] L+D    TONSILLECTOMY       Acetaminophen (TYLENOL PO)  albuterol (PROAIR HFA/PROVENTIL HFA/VENTOLIN HFA) 108 (90 BASE) MCG/ACT Inhaler  azithromycin (ZITHROMAX) 500 MG tablet  azithromycin (ZITHROMAX) 600 MG tablet  ferrous gluconate (FERGON) 324 (38 FE) MG tablet  hydrOXYzine (ATARAX) 25 MG tablet  ibuprofen (ADVIL/MOTRIN) 600 MG tablet  oxyCODONE-acetaminophen (PERCOCET) 5-325 MG per tablet  Prenatal Vit-Fe Fumarate-FA (PRENATAL MULTIVITAMIN  PLUS IRON) 27-0.8 MG TABS per tablet  senna-docusate (SENOKOT-S;PERICOLACE) 8.6-50 MG per tablet      No Known Allergies  Family History  Family History   Problem Relation Age of Onset    Coronary Artery Disease Maternal Grandmother     Hypertension Maternal Grandmother     Hypertension Mother     Anemia Mother     Diabetes Father     Diabetes Paternal Grandfather     Other Cancer Paternal Grandfather     Diabetes Paternal Uncle      Social History   Social History     Tobacco Use    Smoking status: Never    Smokeless tobacco: Never   Substance Use Topics    Alcohol use: No     Alcohol/week: 0.0 standard drinks of alcohol    Drug use: No      Past  medical history, past surgical history, medications, allergies, family history, and social history were reviewed with the patient. No additional pertinent items.      A complete review of systems was performed with pertinent positives and negatives noted in the HPI, and all other systems negative.    Physical Exam   BP: 131/89  Pulse: 82  Temp: 98.5  F (36.9  C)  Resp: 16  Weight: 106.6 kg (235 lb)  SpO2: 100 %  Physical Exam  Constitutional:       General: She is not in acute distress.     Appearance: Normal appearance. She is not diaphoretic.   HENT:      Head: Atraumatic.      Mouth/Throat:      Mouth: Mucous membranes are moist. No oral lesions.      Pharynx: No pharyngeal swelling, oropharyngeal exudate, posterior oropharyngeal erythema or uvula swelling.      Comments: No trismus, no muffled voice, no uvular deviation, normal oropharynx with no erythema or exudate  Eyes:      General: No scleral icterus.     Conjunctiva/sclera: Conjunctivae normal.   Cardiovascular:      Rate and Rhythm: Normal rate.      Heart sounds: Normal heart sounds.   Pulmonary:      Effort: No respiratory distress.      Breath sounds: Normal breath sounds. No stridor. No wheezing, rhonchi or rales.   Chest:      Chest wall: No tenderness.   Abdominal:      General: Abdomen is flat.   Musculoskeletal:      Cervical back: Neck supple.   Skin:     General: Skin is warm.      Findings: No rash.   Neurological:      Mental Status: She is alert.           ED Course, Procedures, & Data      Procedures               No results found for any visits on 05/07/24.  Medications - No data to display  Labs Ordered and Resulted from Time of ED Arrival to Time of ED Departure - No data to display  No orders to display          Critical care was not performed.     Medical Decision Making  The patient's presentation was of straightforward complexity (a clearly self-limited or minor problem).    The patient's evaluation involved:  history and exam without  other MDM data elements    The patient's management necessitated only low risk treatment.    Assessment & Plan    Here in the emergency room patient with a reassuring physical exam as indicated above, normal vital signs, and negative strep throat test  As patient has signs and symptoms of a viral upper respiratory infection and negative strep test, likely patient with a viral upper respiratory infection and less likely any acute bacterial infection  Although I considered labs and a chest x-ray, deferred as patient had an otherwise reassuring physical exam and history  Patient discharged home with strict return precaution to come back to the emergency room for new or worsening symptoms, instructed to follow-up with her primary care doctor as needed  Also patient given instructions for symptomatic treatment of viral upper respiratory infection    I have reviewed the nursing notes. I have reviewed the findings, diagnosis, plan and need for follow up with the patient.    New Prescriptions    No medications on file       Final diagnoses:   None   IJohnny, am serving as a trained medical scribe to document services personally performed by Eliot Diaz MD based on the provider's statements to me on May 7, 2024.  This document has been checked and approved by the attending provider.    IEliot MD, was physically present and have reviewed and verified the accuracy of this note documented by Johnny Martins medical scribe.      Eliot Diaz MD  [facility] EMERGENCY DEPARTMENT  5/7/2024     Eliot Diaz MD  05/07/24 [id number]

## 2024-07-13 ENCOUNTER — HEALTH MAINTENANCE LETTER (OUTPATIENT)
Age: 29
End: 2024-07-13

## 2025-07-19 ENCOUNTER — HEALTH MAINTENANCE LETTER (OUTPATIENT)
Age: 30
End: 2025-07-19

## (undated) DEVICE — BARRIER INTERCEED 3X4" 4350

## (undated) DEVICE — PACK C-SECTION LF PL15OTA83B

## (undated) DEVICE — SOL NACL 0.9% IRRIG 1000ML BOTTLE 07138-09

## (undated) DEVICE — PREP CHLORAPREP 26ML TINTED ORANGE  260815

## (undated) DEVICE — SUCTION CANISTER MEDIVAC LINER 1500ML W/LID 65651-515

## (undated) DEVICE — STRAP KNEE/BODY 31143004

## (undated) DEVICE — STOCKING SLEEVE COMPRESSION CALF LG

## (undated) DEVICE — GLOVE PROTEXIS BLUE W/NEU-THERA 6.5  2D73EB65

## (undated) DEVICE — GLOVE ESTEEM POWDER FREE SMT 6.5  2D72PT65

## (undated) DEVICE — DRSG STERI STRIP 1/4X3" R1541

## (undated) DEVICE — DRSG ABDOMINAL 07 1/2X8" 7197D

## (undated) DEVICE — SU VICRYL 4-0 PS-2 18" UND J496G

## (undated) DEVICE — SOL WATER IRRIG 1000ML BOTTLE 07139-09

## (undated) DEVICE — CATH TRAY FOLEY 16FR SILICONE 907416

## (undated) DEVICE — SU MONOCRYL 0 CTB-1 36" YB946

## (undated) DEVICE — SOL ADH LIQUID BENZOIN SWAB 0.6ML C1544

## (undated) DEVICE — SU VICRYL 0 CT-1 36" J346H

## (undated) DEVICE — ESU GROUND PAD UNIVERSAL W/O CORD

## (undated) DEVICE — BASIN SET MAJOR

## (undated) RX ORDER — MORPHINE SULFATE 1 MG/ML
INJECTION, SOLUTION EPIDURAL; INTRATHECAL; INTRAVENOUS
Status: DISPENSED
Start: 2017-06-24

## (undated) RX ORDER — FENTANYL CITRATE 50 UG/ML
INJECTION, SOLUTION INTRAMUSCULAR; INTRAVENOUS
Status: DISPENSED
Start: 2017-06-24

## (undated) RX ORDER — PHENYLEPHRINE HCL IN 0.9% NACL 1 MG/10 ML
SYRINGE (ML) INTRAVENOUS
Status: DISPENSED
Start: 2017-06-24

## (undated) RX ORDER — KETOROLAC TROMETHAMINE 30 MG/ML
INJECTION, SOLUTION INTRAMUSCULAR; INTRAVENOUS
Status: DISPENSED
Start: 2017-06-24

## (undated) RX ORDER — ONDANSETRON 2 MG/ML
INJECTION INTRAMUSCULAR; INTRAVENOUS
Status: DISPENSED
Start: 2017-06-24